# Patient Record
Sex: FEMALE | Race: BLACK OR AFRICAN AMERICAN | Employment: UNEMPLOYED
[De-identification: names, ages, dates, MRNs, and addresses within clinical notes are randomized per-mention and may not be internally consistent; named-entity substitution may affect disease eponyms.]

---

## 2017-01-06 ENCOUNTER — OFFICE VISIT (OUTPATIENT)
Dept: BARIATRICS/WEIGHT MGMT | Facility: CLINIC | Age: 37
End: 2017-01-06

## 2017-01-06 ENCOUNTER — TELEPHONE (OUTPATIENT)
Dept: FAMILY MEDICINE CLINIC | Facility: CLINIC | Age: 37
End: 2017-01-06

## 2017-01-06 VITALS
HEART RATE: 74 BPM | DIASTOLIC BLOOD PRESSURE: 68 MMHG | RESPIRATION RATE: 16 BRPM | BODY MASS INDEX: 50.02 KG/M2 | SYSTOLIC BLOOD PRESSURE: 128 MMHG | WEIGHT: 293 LBS | HEIGHT: 64 IN

## 2017-01-06 DIAGNOSIS — F32.A ANXIETY AND DEPRESSION: ICD-10-CM

## 2017-01-06 DIAGNOSIS — R73.03 PREDIABETES: Primary | ICD-10-CM

## 2017-01-06 DIAGNOSIS — M54.10 RADICULAR LOW BACK PAIN: ICD-10-CM

## 2017-01-06 DIAGNOSIS — E66.01 MORBID OBESITY WITH BMI OF 50.0-59.9, ADULT (HCC): ICD-10-CM

## 2017-01-06 DIAGNOSIS — F81.9 LEARNING DISABILITY: ICD-10-CM

## 2017-01-06 DIAGNOSIS — D68.9 BLOOD CLOTTING DISORDER (HCC): ICD-10-CM

## 2017-01-06 DIAGNOSIS — I83.93 VARICOSE VEINS OF LEGS: ICD-10-CM

## 2017-01-06 DIAGNOSIS — F41.9 ANXIETY AND DEPRESSION: ICD-10-CM

## 2017-01-06 DIAGNOSIS — Z86.69 HX OF MIGRAINES: ICD-10-CM

## 2017-01-06 PROCEDURE — 99204 OFFICE O/P NEW MOD 45 MIN: CPT | Performed by: NURSE PRACTITIONER

## 2017-01-06 RX ORDER — ASPIRIN 81 MG/1
TABLET ORAL
COMMUNITY
Start: 2016-11-30 | End: 2017-01-17

## 2017-01-06 RX ORDER — PHENTERMINE HYDROCHLORIDE 37.5 MG/1
37.5 CAPSULE ORAL EVERY MORNING
COMMUNITY
End: 2017-01-12

## 2017-01-06 RX ORDER — VENLAFAXINE 50 MG/1
50 TABLET ORAL DAILY
COMMUNITY
End: 2017-05-19 | Stop reason: SDUPTHER

## 2017-01-06 RX ORDER — OMEGA-3S/DHA/EPA/FISH OIL/D3 300MG-1000
CAPSULE ORAL
COMMUNITY
Start: 2016-11-30

## 2017-01-12 ENCOUNTER — OFFICE VISIT (OUTPATIENT)
Dept: FAMILY MEDICINE CLINIC | Facility: CLINIC | Age: 37
End: 2017-01-12

## 2017-01-12 VITALS
SYSTOLIC BLOOD PRESSURE: 120 MMHG | HEART RATE: 85 BPM | OXYGEN SATURATION: 98 % | WEIGHT: 293 LBS | HEIGHT: 63 IN | BODY MASS INDEX: 51.91 KG/M2 | DIASTOLIC BLOOD PRESSURE: 74 MMHG | RESPIRATION RATE: 16 BRPM

## 2017-01-12 DIAGNOSIS — E66.01 MORBID OBESITY WITH BMI OF 50.0-59.9, ADULT (HCC): ICD-10-CM

## 2017-01-12 PROCEDURE — 99213 OFFICE O/P EST LOW 20 MIN: CPT | Performed by: FAMILY MEDICINE

## 2017-01-12 RX ORDER — PHENTERMINE HYDROCHLORIDE 37.5 MG/1
37.5 CAPSULE ORAL EVERY MORNING
Qty: 30 CAPSULE | Refills: 0 | Status: CANCELLED | OUTPATIENT
Start: 2017-01-12

## 2017-01-12 RX ORDER — PHENTERMINE HYDROCHLORIDE 37.5 MG/1
37.5 TABLET ORAL
Qty: 30 TABLET | Refills: 0 | Status: CANCELLED | OUTPATIENT
Start: 2017-01-12 | End: 2017-02-11

## 2017-01-12 RX ORDER — PHENTERMINE HYDROCHLORIDE 37.5 MG/1
37.5 TABLET ORAL
Qty: 30 TABLET | Refills: 0 | Status: SHIPPED | OUTPATIENT
Start: 2017-01-12 | End: 2017-02-06 | Stop reason: SDUPTHER

## 2017-01-12 ASSESSMENT — PATIENT HEALTH QUESTIONNAIRE - PHQ9
2. FEELING DOWN, DEPRESSED OR HOPELESS: 0
SUM OF ALL RESPONSES TO PHQ QUESTIONS 1-9: 0
SUM OF ALL RESPONSES TO PHQ9 QUESTIONS 1 & 2: 0
1. LITTLE INTEREST OR PLEASURE IN DOING THINGS: 0

## 2017-01-16 ENCOUNTER — TELEPHONE (OUTPATIENT)
Dept: BARIATRICS/WEIGHT MGMT | Facility: CLINIC | Age: 37
End: 2017-01-16

## 2017-01-17 ENCOUNTER — OFFICE VISIT (OUTPATIENT)
Dept: BARIATRICS/WEIGHT MGMT | Facility: CLINIC | Age: 37
End: 2017-01-17

## 2017-01-17 VITALS
DIASTOLIC BLOOD PRESSURE: 70 MMHG | HEIGHT: 63 IN | HEART RATE: 68 BPM | BODY MASS INDEX: 51.91 KG/M2 | WEIGHT: 293 LBS | SYSTOLIC BLOOD PRESSURE: 132 MMHG

## 2017-01-17 DIAGNOSIS — F32.A ANXIETY AND DEPRESSION: ICD-10-CM

## 2017-01-17 DIAGNOSIS — F41.9 ANXIETY AND DEPRESSION: ICD-10-CM

## 2017-01-17 DIAGNOSIS — E66.01 MORBID OBESITY WITH BMI OF 50.0-59.9, ADULT (HCC): ICD-10-CM

## 2017-01-17 DIAGNOSIS — M54.10 RADICULAR LOW BACK PAIN: ICD-10-CM

## 2017-01-17 DIAGNOSIS — R73.03 PREDIABETES: Primary | ICD-10-CM

## 2017-01-17 PROCEDURE — 99213 OFFICE O/P EST LOW 20 MIN: CPT | Performed by: NURSE PRACTITIONER

## 2017-01-17 RX ORDER — ASPIRIN 81 MG/1
81 TABLET, CHEWABLE ORAL DAILY
Qty: 30 TABLET | Refills: 3 | Status: SHIPPED | OUTPATIENT
Start: 2017-01-17

## 2017-01-24 ENCOUNTER — OFFICE VISIT (OUTPATIENT)
Dept: BARIATRICS/WEIGHT MGMT | Facility: CLINIC | Age: 37
End: 2017-01-24

## 2017-01-24 VITALS
HEIGHT: 63 IN | DIASTOLIC BLOOD PRESSURE: 70 MMHG | WEIGHT: 293 LBS | SYSTOLIC BLOOD PRESSURE: 132 MMHG | BODY MASS INDEX: 51.91 KG/M2 | HEART RATE: 80 BPM

## 2017-01-24 DIAGNOSIS — E66.01 MORBID OBESITY WITH BMI OF 50.0-59.9, ADULT (HCC): ICD-10-CM

## 2017-01-24 DIAGNOSIS — F41.9 ANXIETY AND DEPRESSION: ICD-10-CM

## 2017-01-24 DIAGNOSIS — M54.10 RADICULAR LOW BACK PAIN: ICD-10-CM

## 2017-01-24 DIAGNOSIS — F32.A ANXIETY AND DEPRESSION: ICD-10-CM

## 2017-01-24 DIAGNOSIS — I83.93 VARICOSE VEINS OF LEGS: ICD-10-CM

## 2017-01-24 DIAGNOSIS — R73.03 PREDIABETES: Primary | ICD-10-CM

## 2017-01-24 PROCEDURE — 99213 OFFICE O/P EST LOW 20 MIN: CPT | Performed by: NURSE PRACTITIONER

## 2017-01-30 ENCOUNTER — TELEPHONE (OUTPATIENT)
Dept: FAMILY MEDICINE CLINIC | Facility: CLINIC | Age: 37
End: 2017-01-30

## 2017-02-06 ENCOUNTER — OFFICE VISIT (OUTPATIENT)
Dept: FAMILY MEDICINE CLINIC | Facility: CLINIC | Age: 37
End: 2017-02-06

## 2017-02-06 VITALS
DIASTOLIC BLOOD PRESSURE: 91 MMHG | HEART RATE: 86 BPM | HEIGHT: 63 IN | SYSTOLIC BLOOD PRESSURE: 133 MMHG | WEIGHT: 293 LBS | BODY MASS INDEX: 51.91 KG/M2

## 2017-02-06 DIAGNOSIS — E66.01 MORBID OBESITY WITH BMI OF 50.0-59.9, ADULT (HCC): ICD-10-CM

## 2017-02-06 DIAGNOSIS — R20.0 NUMBNESS AND TINGLING OF RIGHT ARM: Primary | ICD-10-CM

## 2017-02-06 DIAGNOSIS — R20.2 NUMBNESS AND TINGLING OF RIGHT ARM: Primary | ICD-10-CM

## 2017-02-06 PROCEDURE — 99214 OFFICE O/P EST MOD 30 MIN: CPT | Performed by: FAMILY MEDICINE

## 2017-02-06 RX ORDER — PHENTERMINE HYDROCHLORIDE 37.5 MG/1
37.5 TABLET ORAL
Qty: 30 TABLET | Refills: 0 | Status: SHIPPED | OUTPATIENT
Start: 2017-02-06 | End: 2017-03-07 | Stop reason: ALTCHOICE

## 2017-02-06 RX ORDER — PHENTERMINE HYDROCHLORIDE 37.5 MG/1
37.5 TABLET ORAL
Qty: 30 TABLET | Refills: 0 | Status: SHIPPED | OUTPATIENT
Start: 2017-02-06 | End: 2017-02-06 | Stop reason: SDUPTHER

## 2017-02-07 RX ORDER — METOPROLOL SUCCINATE 50 MG/1
TABLET, EXTENDED RELEASE ORAL
Qty: 30 TABLET | Refills: 3 | Status: SHIPPED | OUTPATIENT
Start: 2017-02-07 | End: 2017-07-05 | Stop reason: SDUPTHER

## 2017-02-10 ENCOUNTER — OFFICE VISIT (OUTPATIENT)
Dept: BARIATRICS/WEIGHT MGMT | Facility: CLINIC | Age: 37
End: 2017-02-10

## 2017-02-10 VITALS
HEIGHT: 63 IN | WEIGHT: 293 LBS | SYSTOLIC BLOOD PRESSURE: 138 MMHG | BODY MASS INDEX: 51.91 KG/M2 | DIASTOLIC BLOOD PRESSURE: 84 MMHG | HEART RATE: 78 BPM

## 2017-02-10 DIAGNOSIS — D68.9 BLOOD CLOTTING DISORDER (HCC): ICD-10-CM

## 2017-02-10 DIAGNOSIS — F41.9 ANXIETY AND DEPRESSION: ICD-10-CM

## 2017-02-10 DIAGNOSIS — F81.9 LEARNING DISABILITY: ICD-10-CM

## 2017-02-10 DIAGNOSIS — F32.A ANXIETY AND DEPRESSION: ICD-10-CM

## 2017-02-10 DIAGNOSIS — I83.93 VARICOSE VEINS OF LEGS: ICD-10-CM

## 2017-02-10 DIAGNOSIS — E66.01 MORBID OBESITY WITH BMI OF 50.0-59.9, ADULT (HCC): ICD-10-CM

## 2017-02-10 DIAGNOSIS — R73.03 PREDIABETES: Primary | ICD-10-CM

## 2017-02-10 DIAGNOSIS — M54.10 RADICULAR LOW BACK PAIN: ICD-10-CM

## 2017-02-10 PROCEDURE — 99213 OFFICE O/P EST LOW 20 MIN: CPT | Performed by: NURSE PRACTITIONER

## 2017-02-15 ENCOUNTER — NURSE ONLY (OUTPATIENT)
Dept: FAMILY MEDICINE CLINIC | Facility: CLINIC | Age: 37
End: 2017-02-15

## 2017-02-15 DIAGNOSIS — H93.93 EAR PROBLEM, BILATERAL: Primary | ICD-10-CM

## 2017-02-24 ENCOUNTER — OFFICE VISIT (OUTPATIENT)
Dept: BARIATRICS/WEIGHT MGMT | Facility: CLINIC | Age: 37
End: 2017-02-24

## 2017-02-24 ENCOUNTER — TELEPHONE (OUTPATIENT)
Dept: FAMILY MEDICINE CLINIC | Facility: CLINIC | Age: 37
End: 2017-02-24

## 2017-02-24 VITALS
WEIGHT: 293 LBS | DIASTOLIC BLOOD PRESSURE: 68 MMHG | BODY MASS INDEX: 51.91 KG/M2 | HEART RATE: 74 BPM | SYSTOLIC BLOOD PRESSURE: 128 MMHG | HEIGHT: 63 IN

## 2017-02-24 DIAGNOSIS — F41.9 ANXIETY AND DEPRESSION: ICD-10-CM

## 2017-02-24 DIAGNOSIS — E66.01 MORBID OBESITY WITH BMI OF 50.0-59.9, ADULT (HCC): ICD-10-CM

## 2017-02-24 DIAGNOSIS — M54.10 RADICULAR LOW BACK PAIN: ICD-10-CM

## 2017-02-24 DIAGNOSIS — F32.A ANXIETY AND DEPRESSION: ICD-10-CM

## 2017-02-24 DIAGNOSIS — F81.9 LEARNING DISABILITY: ICD-10-CM

## 2017-02-24 DIAGNOSIS — R73.03 PREDIABETES: Primary | ICD-10-CM

## 2017-02-24 DIAGNOSIS — I83.93 VARICOSE VEINS OF LEGS: ICD-10-CM

## 2017-02-24 PROCEDURE — 99213 OFFICE O/P EST LOW 20 MIN: CPT | Performed by: NURSE PRACTITIONER

## 2017-03-03 ENCOUNTER — OFFICE VISIT (OUTPATIENT)
Dept: BARIATRICS/WEIGHT MGMT | Facility: CLINIC | Age: 37
End: 2017-03-03

## 2017-03-03 VITALS
BODY MASS INDEX: 51.91 KG/M2 | DIASTOLIC BLOOD PRESSURE: 82 MMHG | SYSTOLIC BLOOD PRESSURE: 126 MMHG | WEIGHT: 293 LBS | HEIGHT: 63 IN | HEART RATE: 68 BPM

## 2017-03-03 DIAGNOSIS — M54.10 RADICULAR LOW BACK PAIN: Primary | ICD-10-CM

## 2017-03-03 DIAGNOSIS — F32.A ANXIETY AND DEPRESSION: ICD-10-CM

## 2017-03-03 DIAGNOSIS — E66.01 MORBID OBESITY WITH BMI OF 50.0-59.9, ADULT (HCC): ICD-10-CM

## 2017-03-03 DIAGNOSIS — F41.9 ANXIETY AND DEPRESSION: ICD-10-CM

## 2017-03-03 DIAGNOSIS — R73.03 PREDIABETES: ICD-10-CM

## 2017-03-03 PROCEDURE — 99213 OFFICE O/P EST LOW 20 MIN: CPT | Performed by: NURSE PRACTITIONER

## 2017-03-07 ENCOUNTER — OFFICE VISIT (OUTPATIENT)
Dept: FAMILY MEDICINE CLINIC | Facility: CLINIC | Age: 37
End: 2017-03-07

## 2017-03-07 VITALS
HEIGHT: 63 IN | WEIGHT: 293 LBS | DIASTOLIC BLOOD PRESSURE: 74 MMHG | OXYGEN SATURATION: 98 % | BODY MASS INDEX: 51.91 KG/M2 | HEART RATE: 76 BPM | RESPIRATION RATE: 22 BRPM | SYSTOLIC BLOOD PRESSURE: 138 MMHG

## 2017-03-07 DIAGNOSIS — E66.01 MORBID OBESITY WITH BMI OF 50.0-59.9, ADULT (HCC): Primary | ICD-10-CM

## 2017-03-07 PROCEDURE — 99213 OFFICE O/P EST LOW 20 MIN: CPT | Performed by: FAMILY MEDICINE

## 2017-03-07 RX ORDER — LANCING DEVICE
EACH MISCELLANEOUS
COMMUNITY
Start: 2017-02-10

## 2017-03-07 RX ORDER — METOPROLOL TARTRATE 75 MG/1
75 TABLET, FILM COATED ORAL
COMMUNITY
End: 2017-03-07 | Stop reason: DRUGHIGH

## 2017-03-07 RX ORDER — CALCIUM CITRATE/VITAMIN D3 200MG-6.25
TABLET ORAL
COMMUNITY
Start: 2017-02-10

## 2017-03-09 ENCOUNTER — OFFICE VISIT (OUTPATIENT)
Dept: OBGYN | Facility: CLINIC | Age: 37
End: 2017-03-09

## 2017-03-09 ENCOUNTER — HOSPITAL ENCOUNTER (OUTPATIENT)
Age: 37
Setting detail: SPECIMEN
Discharge: HOME | End: 2017-03-09
Payer: MEDICARE

## 2017-03-09 VITALS
BODY MASS INDEX: 51.91 KG/M2 | HEIGHT: 63 IN | SYSTOLIC BLOOD PRESSURE: 130 MMHG | WEIGHT: 293 LBS | DIASTOLIC BLOOD PRESSURE: 88 MMHG

## 2017-03-09 DIAGNOSIS — Z01.419 ENCOUNTER FOR GYNECOLOGICAL EXAMINATION WITHOUT ABNORMAL FINDING: Primary | ICD-10-CM

## 2017-03-09 PROCEDURE — 99385 PREV VISIT NEW AGE 18-39: CPT | Performed by: OBSTETRICS & GYNECOLOGY

## 2017-03-09 ASSESSMENT — ENCOUNTER SYMPTOMS
SHORTNESS OF BREATH: 0
BLURRED VISION: 0
BACK PAIN: 0
COUGH: 0
ABDOMINAL PAIN: 0
CONSTIPATION: 0
PHOTOPHOBIA: 0

## 2017-03-10 ENCOUNTER — OFFICE VISIT (OUTPATIENT)
Dept: BARIATRICS/WEIGHT MGMT | Facility: CLINIC | Age: 37
End: 2017-03-10

## 2017-03-10 VITALS
SYSTOLIC BLOOD PRESSURE: 126 MMHG | BODY MASS INDEX: 51.91 KG/M2 | DIASTOLIC BLOOD PRESSURE: 80 MMHG | HEIGHT: 63 IN | HEART RATE: 74 BPM | WEIGHT: 293 LBS

## 2017-03-10 DIAGNOSIS — E66.01 MORBID OBESITY WITH BMI OF 50.0-59.9, ADULT (HCC): ICD-10-CM

## 2017-03-10 DIAGNOSIS — F41.9 ANXIETY AND DEPRESSION: ICD-10-CM

## 2017-03-10 DIAGNOSIS — M54.10 RADICULAR LOW BACK PAIN: Primary | ICD-10-CM

## 2017-03-10 DIAGNOSIS — F32.A ANXIETY AND DEPRESSION: ICD-10-CM

## 2017-03-10 DIAGNOSIS — R73.03 PREDIABETES: ICD-10-CM

## 2017-03-10 PROCEDURE — 99213 OFFICE O/P EST LOW 20 MIN: CPT | Performed by: NURSE PRACTITIONER

## 2017-03-14 LAB — CYTOLOGY REPORT: NORMAL

## 2017-03-24 ENCOUNTER — OFFICE VISIT (OUTPATIENT)
Dept: BARIATRICS/WEIGHT MGMT | Age: 37
End: 2017-03-24
Payer: MEDICARE

## 2017-03-24 VITALS
WEIGHT: 293 LBS | BODY MASS INDEX: 51.91 KG/M2 | HEART RATE: 68 BPM | HEIGHT: 63 IN | DIASTOLIC BLOOD PRESSURE: 78 MMHG | SYSTOLIC BLOOD PRESSURE: 128 MMHG

## 2017-03-24 DIAGNOSIS — R73.03 PREDIABETES: ICD-10-CM

## 2017-03-24 DIAGNOSIS — F41.9 ANXIETY AND DEPRESSION: ICD-10-CM

## 2017-03-24 DIAGNOSIS — F32.A ANXIETY AND DEPRESSION: ICD-10-CM

## 2017-03-24 DIAGNOSIS — E66.01 MORBID OBESITY WITH BMI OF 50.0-59.9, ADULT (HCC): ICD-10-CM

## 2017-03-24 DIAGNOSIS — M54.10 RADICULAR LOW BACK PAIN: Primary | ICD-10-CM

## 2017-03-24 PROCEDURE — 99213 OFFICE O/P EST LOW 20 MIN: CPT | Performed by: NURSE PRACTITIONER

## 2017-04-07 ENCOUNTER — OFFICE VISIT (OUTPATIENT)
Dept: BARIATRICS/WEIGHT MGMT | Age: 37
End: 2017-04-07
Payer: MEDICARE

## 2017-04-07 VITALS
SYSTOLIC BLOOD PRESSURE: 124 MMHG | HEIGHT: 63 IN | WEIGHT: 293 LBS | DIASTOLIC BLOOD PRESSURE: 74 MMHG | BODY MASS INDEX: 51.91 KG/M2 | HEART RATE: 68 BPM

## 2017-04-07 DIAGNOSIS — I83.93 VARICOSE VEINS OF LEGS: ICD-10-CM

## 2017-04-07 DIAGNOSIS — R73.03 PREDIABETES: Primary | ICD-10-CM

## 2017-04-07 DIAGNOSIS — M54.10 RADICULAR LOW BACK PAIN: ICD-10-CM

## 2017-04-07 DIAGNOSIS — F32.A ANXIETY AND DEPRESSION: ICD-10-CM

## 2017-04-07 DIAGNOSIS — E66.01 MORBID OBESITY WITH BMI OF 50.0-59.9, ADULT (HCC): ICD-10-CM

## 2017-04-07 DIAGNOSIS — F41.9 ANXIETY AND DEPRESSION: ICD-10-CM

## 2017-04-07 PROCEDURE — 99213 OFFICE O/P EST LOW 20 MIN: CPT | Performed by: NURSE PRACTITIONER

## 2017-05-09 ENCOUNTER — OFFICE VISIT (OUTPATIENT)
Dept: BARIATRICS/WEIGHT MGMT | Age: 37
End: 2017-05-09
Payer: MEDICARE

## 2017-05-09 VITALS
HEIGHT: 63 IN | HEART RATE: 74 BPM | BODY MASS INDEX: 51.91 KG/M2 | WEIGHT: 293 LBS | SYSTOLIC BLOOD PRESSURE: 128 MMHG | DIASTOLIC BLOOD PRESSURE: 78 MMHG

## 2017-05-09 DIAGNOSIS — I83.93 VARICOSE VEINS OF LEGS: ICD-10-CM

## 2017-05-09 DIAGNOSIS — M54.10 RADICULAR LOW BACK PAIN: ICD-10-CM

## 2017-05-09 DIAGNOSIS — R73.03 PREDIABETES: Primary | ICD-10-CM

## 2017-05-09 DIAGNOSIS — F32.A ANXIETY AND DEPRESSION: ICD-10-CM

## 2017-05-09 DIAGNOSIS — E66.01 MORBID OBESITY WITH BMI OF 50.0-59.9, ADULT (HCC): ICD-10-CM

## 2017-05-09 DIAGNOSIS — F41.9 ANXIETY AND DEPRESSION: ICD-10-CM

## 2017-05-09 PROCEDURE — 99213 OFFICE O/P EST LOW 20 MIN: CPT | Performed by: NURSE PRACTITIONER

## 2017-05-19 ENCOUNTER — OFFICE VISIT (OUTPATIENT)
Dept: FAMILY MEDICINE CLINIC | Age: 37
End: 2017-05-19
Payer: MEDICARE

## 2017-05-19 VITALS
WEIGHT: 293 LBS | BODY MASS INDEX: 51.91 KG/M2 | HEART RATE: 78 BPM | OXYGEN SATURATION: 99 % | DIASTOLIC BLOOD PRESSURE: 77 MMHG | HEIGHT: 63 IN | SYSTOLIC BLOOD PRESSURE: 128 MMHG

## 2017-05-19 DIAGNOSIS — M54.42 LUMBAGO WITH SCIATICA, LEFT SIDE: Primary | ICD-10-CM

## 2017-05-19 DIAGNOSIS — R73.03 PRE-DIABETES: ICD-10-CM

## 2017-05-19 PROCEDURE — 99214 OFFICE O/P EST MOD 30 MIN: CPT | Performed by: FAMILY MEDICINE

## 2017-05-19 RX ORDER — VENLAFAXINE HYDROCHLORIDE 75 MG/1
1 CAPSULE, EXTENDED RELEASE ORAL DAILY
COMMUNITY
Start: 2017-04-17 | End: 2017-11-08

## 2017-05-19 RX ORDER — NAPROXEN 500 MG/1
500 TABLET ORAL 2 TIMES DAILY WITH MEALS
Qty: 60 TABLET | Refills: 3 | Status: SHIPPED | OUTPATIENT
Start: 2017-05-19 | End: 2017-05-20 | Stop reason: SDUPTHER

## 2017-05-20 DIAGNOSIS — M54.42 LUMBAGO WITH SCIATICA, LEFT SIDE: ICD-10-CM

## 2017-05-20 RX ORDER — NAPROXEN 500 MG/1
500 TABLET ORAL 2 TIMES DAILY WITH MEALS
Qty: 60 TABLET | Refills: 3 | Status: SHIPPED | OUTPATIENT
Start: 2017-05-20

## 2017-06-01 ENCOUNTER — HOSPITAL ENCOUNTER (OUTPATIENT)
Dept: PHYSICAL THERAPY | Facility: CLINIC | Age: 37
Setting detail: THERAPIES SERIES
Discharge: HOME | End: 2017-06-01
Payer: MEDICARE

## 2017-06-01 PROCEDURE — 97162 PT EVAL MOD COMPLEX 30 MIN: CPT

## 2017-06-01 PROCEDURE — 97110 THERAPEUTIC EXERCISES: CPT

## 2017-06-05 ENCOUNTER — HOSPITAL ENCOUNTER (OUTPATIENT)
Dept: PHYSICAL THERAPY | Facility: CLINIC | Age: 37
Setting detail: THERAPIES SERIES
Discharge: HOME | End: 2017-06-05
Payer: MEDICARE

## 2017-06-05 PROCEDURE — 97110 THERAPEUTIC EXERCISES: CPT

## 2017-06-07 ENCOUNTER — HOSPITAL ENCOUNTER (OUTPATIENT)
Dept: PHYSICAL THERAPY | Facility: CLINIC | Age: 37
Setting detail: THERAPIES SERIES
Discharge: HOME | End: 2017-06-07
Payer: MEDICARE

## 2017-06-12 ENCOUNTER — HOSPITAL ENCOUNTER (OUTPATIENT)
Dept: PHYSICAL THERAPY | Facility: CLINIC | Age: 37
Setting detail: THERAPIES SERIES
Discharge: HOME | End: 2017-06-12
Payer: MEDICARE

## 2017-06-13 ENCOUNTER — OFFICE VISIT (OUTPATIENT)
Dept: BARIATRICS/WEIGHT MGMT | Age: 37
End: 2017-06-13
Payer: MEDICARE

## 2017-06-13 VITALS
HEIGHT: 63 IN | SYSTOLIC BLOOD PRESSURE: 126 MMHG | WEIGHT: 293 LBS | BODY MASS INDEX: 51.91 KG/M2 | HEART RATE: 72 BPM | DIASTOLIC BLOOD PRESSURE: 70 MMHG

## 2017-06-13 DIAGNOSIS — M54.10 RADICULAR LOW BACK PAIN: ICD-10-CM

## 2017-06-13 DIAGNOSIS — R73.03 PREDIABETES: Primary | ICD-10-CM

## 2017-06-13 DIAGNOSIS — E66.01 MORBID OBESITY WITH BMI OF 50.0-59.9, ADULT (HCC): ICD-10-CM

## 2017-06-13 DIAGNOSIS — F32.A ANXIETY AND DEPRESSION: ICD-10-CM

## 2017-06-13 DIAGNOSIS — F41.9 ANXIETY AND DEPRESSION: ICD-10-CM

## 2017-06-13 DIAGNOSIS — I83.93 VARICOSE VEINS OF LEGS: ICD-10-CM

## 2017-06-13 PROCEDURE — 99213 OFFICE O/P EST LOW 20 MIN: CPT | Performed by: NURSE PRACTITIONER

## 2017-06-15 ENCOUNTER — HOSPITAL ENCOUNTER (OUTPATIENT)
Dept: PHYSICAL THERAPY | Facility: CLINIC | Age: 37
Setting detail: THERAPIES SERIES
Discharge: HOME | End: 2017-06-15
Payer: MEDICARE

## 2017-06-15 PROCEDURE — 97110 THERAPEUTIC EXERCISES: CPT

## 2017-06-15 PROCEDURE — 97530 THERAPEUTIC ACTIVITIES: CPT

## 2017-06-19 ENCOUNTER — HOSPITAL ENCOUNTER (OUTPATIENT)
Dept: PHYSICAL THERAPY | Facility: CLINIC | Age: 37
Setting detail: THERAPIES SERIES
Discharge: HOME | End: 2017-06-19
Payer: MEDICARE

## 2017-06-21 ENCOUNTER — HOSPITAL ENCOUNTER (OUTPATIENT)
Dept: PHYSICAL THERAPY | Facility: CLINIC | Age: 37
Setting detail: THERAPIES SERIES
Discharge: HOME | End: 2017-06-21
Payer: MEDICARE

## 2017-06-21 PROCEDURE — 97110 THERAPEUTIC EXERCISES: CPT

## 2017-06-21 PROCEDURE — 97140 MANUAL THERAPY 1/> REGIONS: CPT

## 2017-08-08 ENCOUNTER — OFFICE VISIT (OUTPATIENT)
Dept: BARIATRICS/WEIGHT MGMT | Age: 37
End: 2017-08-08
Payer: MEDICARE

## 2017-08-08 VITALS
SYSTOLIC BLOOD PRESSURE: 124 MMHG | DIASTOLIC BLOOD PRESSURE: 68 MMHG | BODY MASS INDEX: 51.91 KG/M2 | HEIGHT: 63 IN | WEIGHT: 293 LBS | HEART RATE: 74 BPM

## 2017-08-08 DIAGNOSIS — M54.10 RADICULAR LOW BACK PAIN: ICD-10-CM

## 2017-08-08 DIAGNOSIS — I83.93 VARICOSE VEINS OF LEGS: ICD-10-CM

## 2017-08-08 DIAGNOSIS — R73.03 PREDIABETES: Primary | ICD-10-CM

## 2017-08-08 DIAGNOSIS — E66.01 MORBID OBESITY WITH BMI OF 50.0-59.9, ADULT (HCC): ICD-10-CM

## 2017-08-08 DIAGNOSIS — F32.A ANXIETY AND DEPRESSION: ICD-10-CM

## 2017-08-08 DIAGNOSIS — F81.9 LEARNING DISABILITY: ICD-10-CM

## 2017-08-08 DIAGNOSIS — F41.9 ANXIETY AND DEPRESSION: ICD-10-CM

## 2017-08-08 PROCEDURE — 99213 OFFICE O/P EST LOW 20 MIN: CPT | Performed by: NURSE PRACTITIONER

## 2017-09-12 ENCOUNTER — OFFICE VISIT (OUTPATIENT)
Dept: BARIATRICS/WEIGHT MGMT | Age: 37
End: 2017-09-12
Payer: MEDICARE

## 2017-09-12 VITALS
WEIGHT: 293 LBS | HEART RATE: 68 BPM | HEIGHT: 63 IN | DIASTOLIC BLOOD PRESSURE: 74 MMHG | BODY MASS INDEX: 51.91 KG/M2 | SYSTOLIC BLOOD PRESSURE: 120 MMHG

## 2017-09-12 DIAGNOSIS — R73.03 PREDIABETES: Primary | ICD-10-CM

## 2017-09-12 DIAGNOSIS — E66.01 MORBID OBESITY WITH BMI OF 50.0-59.9, ADULT (HCC): ICD-10-CM

## 2017-09-12 DIAGNOSIS — F32.A ANXIETY AND DEPRESSION: ICD-10-CM

## 2017-09-12 DIAGNOSIS — M54.10 RADICULAR LOW BACK PAIN: ICD-10-CM

## 2017-09-12 DIAGNOSIS — I83.93 VARICOSE VEINS OF LEGS: ICD-10-CM

## 2017-09-12 DIAGNOSIS — F41.9 ANXIETY AND DEPRESSION: ICD-10-CM

## 2017-09-12 PROCEDURE — 99213 OFFICE O/P EST LOW 20 MIN: CPT | Performed by: NURSE PRACTITIONER

## 2017-11-07 ENCOUNTER — OFFICE VISIT (OUTPATIENT)
Dept: BARIATRICS/WEIGHT MGMT | Age: 37
End: 2017-11-07
Payer: MEDICARE

## 2017-11-07 VITALS
HEIGHT: 63 IN | DIASTOLIC BLOOD PRESSURE: 88 MMHG | BODY MASS INDEX: 51.91 KG/M2 | HEART RATE: 76 BPM | WEIGHT: 293 LBS | SYSTOLIC BLOOD PRESSURE: 130 MMHG

## 2017-11-07 DIAGNOSIS — M54.10 RADICULAR LOW BACK PAIN: ICD-10-CM

## 2017-11-07 DIAGNOSIS — I83.93 VARICOSE VEINS OF LEGS: ICD-10-CM

## 2017-11-07 DIAGNOSIS — F41.9 ANXIETY AND DEPRESSION: ICD-10-CM

## 2017-11-07 DIAGNOSIS — R73.03 PREDIABETES: Primary | ICD-10-CM

## 2017-11-07 DIAGNOSIS — F32.A ANXIETY AND DEPRESSION: ICD-10-CM

## 2017-11-07 PROCEDURE — G8427 DOCREV CUR MEDS BY ELIG CLIN: HCPCS | Performed by: NURSE PRACTITIONER

## 2017-11-07 PROCEDURE — 99213 OFFICE O/P EST LOW 20 MIN: CPT | Performed by: NURSE PRACTITIONER

## 2017-11-07 PROCEDURE — 1036F TOBACCO NON-USER: CPT | Performed by: NURSE PRACTITIONER

## 2017-11-07 PROCEDURE — G8484 FLU IMMUNIZE NO ADMIN: HCPCS | Performed by: NURSE PRACTITIONER

## 2017-11-07 PROCEDURE — G8417 CALC BMI ABV UP PARAM F/U: HCPCS | Performed by: NURSE PRACTITIONER

## 2017-11-07 NOTE — PROGRESS NOTES
 PHARMACIST CHOICE ALCOHOL 70 % PADS       Lancet Devices (SIMPLE DIAGNOSTICS LANCING DEV) MISC       PHARMACIST CHOICE LANCETS MIS       aspirin 81 MG chewable tablet Take 1 tablet by mouth daily 30 tablet 3    Omega-3 Fatty Acids (FISH OIL BURP-LESS) 1200 MG CAPS       hydrOXYzine (VISTARIL) 25 MG capsule Take 25 mg by mouth      DHA-EPA-VITAMIN E PO Take 2 g by mouth       No current facility-administered medications for this visit. Vital Signs:  /88 (Site: Right Arm, Position: Sitting, Cuff Size: Large Adult)   Pulse 76   Ht 5' 2.99\" (1.6 m)   Wt (!) 316 lb (143.3 kg)   BMI 55.99 kg/m²     BMI/Height/Weight:  Body mass index is 55.99 kg/m². Review of Systems  Constitutional: Weight gain      Objective:      Physical Exam   Vital signs reviewed. General Appearance: Well-developed and well-nourished. No acute distress. Skin: Warm, dry. Head: Normocephalic. Pulmonary/Chest: Normal respiratory effort. Musculoskeletal: Movement x4. Neurological:  Alert and oriented. Individual goal for this encounter:  Lose weight and improve health. [x] Vital signs reviewed and discussed with patient.   [] Labs/EKG reviewed and discussed with patient. [] Blood sugar log reviewed and discussed with patient. [] Physical activity discussed. [] Medication changes recommended. [] Smoking cessation discussed. [x] Specific questions/concerns addressed.       Specific group medical question(s) addressed in this encounter:  Discussion about gout    Group discussion topic for this encounter:     [] Welcome Jumpstart   [] Be a Fat & Calorie    [] Healthy Eating   [] Move Those Muscles   [] Tip the Calorie Balance   [] Take charge of What's Around You   [] Problem Solving   [] Four Keys to Healthy Eating Out   [] [place] of Lifestyle Change   [] Jumpstart your Activity Plan   [] Make Social Cues Work for Conor [place]   [] Ways to Stay Motivated   [] Preparing for Long Term Self-Management   [] More Volume, Fewer Calories   [] Balance Your Thoughts   [] Strengthen your Exercise Program   [] Mindful Eating   [] Stress and Time Managment   [x] Standing Up for Your Health   [] Heart Health   [] Stretching:  The Truth About Flexibility   [] Looking Back and Looking Forward    Total time:  90 minutes, greater than 50% of visit spent in group counseling/education. Assessment:      1. Prediabetes     2. Anxiety and depression     3. Varicose veins of legs     4. Radicular low back pain         Plan:      Track food and weight. Return to clinic as per group medical appointment schedule. Follow-up:  Return in about 1 month (around 12/7/2017). Orders:  No orders of the defined types were placed in this encounter. Prescriptions:  No orders of the defined types were placed in this encounter.       Electronically signed by:  Asim Haney CNP

## 2017-11-08 ENCOUNTER — OFFICE VISIT (OUTPATIENT)
Dept: FAMILY MEDICINE CLINIC | Age: 37
End: 2017-11-08
Payer: MEDICARE

## 2017-11-08 VITALS
HEIGHT: 63 IN | WEIGHT: 293 LBS | OXYGEN SATURATION: 98 % | SYSTOLIC BLOOD PRESSURE: 138 MMHG | BODY MASS INDEX: 51.91 KG/M2 | DIASTOLIC BLOOD PRESSURE: 84 MMHG | RESPIRATION RATE: 16 BRPM | HEART RATE: 79 BPM

## 2017-11-08 DIAGNOSIS — M76.892 LEFT KNEE TENDONITIS: ICD-10-CM

## 2017-11-08 DIAGNOSIS — Z23 NEEDS FLU SHOT: ICD-10-CM

## 2017-11-08 DIAGNOSIS — E66.01 MORBID OBESITY WITH BMI OF 50.0-59.9, ADULT (HCC): Primary | ICD-10-CM

## 2017-11-08 PROCEDURE — 90471 IMMUNIZATION ADMIN: CPT | Performed by: FAMILY MEDICINE

## 2017-11-08 PROCEDURE — G8427 DOCREV CUR MEDS BY ELIG CLIN: HCPCS | Performed by: FAMILY MEDICINE

## 2017-11-08 PROCEDURE — G8484 FLU IMMUNIZE NO ADMIN: HCPCS | Performed by: FAMILY MEDICINE

## 2017-11-08 PROCEDURE — 90688 IIV4 VACCINE SPLT 0.5 ML IM: CPT | Performed by: FAMILY MEDICINE

## 2017-11-08 PROCEDURE — 1036F TOBACCO NON-USER: CPT | Performed by: FAMILY MEDICINE

## 2017-11-08 PROCEDURE — 99213 OFFICE O/P EST LOW 20 MIN: CPT | Performed by: FAMILY MEDICINE

## 2017-11-08 PROCEDURE — G8417 CALC BMI ABV UP PARAM F/U: HCPCS | Performed by: FAMILY MEDICINE

## 2017-11-08 RX ORDER — VENLAFAXINE HYDROCHLORIDE 150 MG/1
150 CAPSULE, EXTENDED RELEASE ORAL DAILY
COMMUNITY

## 2017-11-08 RX ORDER — IBUPROFEN 800 MG/1
800 TABLET ORAL
COMMUNITY
Start: 2017-11-04

## 2017-11-08 RX ORDER — VENLAFAXINE 37.5 MG/1
37.5 TABLET ORAL 3 TIMES DAILY
COMMUNITY

## 2017-11-08 RX ORDER — PHENTERMINE HYDROCHLORIDE 37.5 MG/1
37.5 TABLET ORAL
Qty: 30 TABLET | Refills: 0 | Status: SHIPPED | OUTPATIENT
Start: 2017-11-08 | End: 2017-11-09

## 2017-11-08 RX ORDER — NAPROXEN 500 MG/1
500 TABLET ORAL
COMMUNITY
End: 2017-11-08

## 2017-11-08 RX ORDER — CLONAZEPAM 0.5 MG/1
0.5 TABLET ORAL
COMMUNITY

## 2017-11-08 RX ORDER — TIZANIDINE 4 MG/1
4 TABLET ORAL
COMMUNITY
Start: 2017-11-04

## 2017-11-08 NOTE — PROGRESS NOTES
Visit Information    Have you changed or started any medications since your last visit including any over-the-counter medicines, vitamins, or herbal medicines? no   Have you stopped taking any of your medications? Is so, why? -  no  Are you having any side effects from any of your medications? - no    Have you seen any other physician or provider since your last visit?  no   Have you had any other diagnostic tests since your last visit? yes - at ER   Have you been seen in the emergency room and/or had an admission in a hospital since we last saw you?  yes [facility]    Have you had your routine dental cleaning in the past 6 months?  no     Do you have an active MyChart account? If no, what is the barrier?   Yes    Patient Care Team:  Osman Francisco MD as PCP - General (Family Medicine)    Medical History Review  Past Medical, Family, and Social History reviewed and does not contribute to the patient presenting condition    Health Maintenance   Topic Date Due    DTaP/Tdap/Td vaccine (1 - Tdap) 05/26/2000    Flu vaccine (1) 09/01/2017    HIV screen  01/01/2018 (Originally 5/26/1996)    Cervical cancer screen  03/09/2020

## 2017-11-08 NOTE — PROGRESS NOTES
General FM note    Kyler Hagen is a 40 y.o. female who presents today for follow up on her  medical conditions as noted below. Kyler Hagen is c/o of   Chief Complaint   Patient presents with    Follow-Up from [facility]  for left leg pain       Patient Active Problem List:     Morbid obesity with BMI of 50.0-59.9, adult ([facility]     Prediabetes     Anxiety and depression     Varicose veins of legs     Blood clotting disorder (HCC)     Radicular low back pain     Learning disability     Hx of migraines     Past Medical History:   Diagnosis Date    Anxiety     Blood clotting disorder ([facility]     Depression     Diabetes ([facility]     Varicose vein of leg       Past Surgical History:   Procedure Laterality Date     SECTION      COLONOSCOPY  2016    TONSILLECTOMY       Family History   Problem Relation Age of Onset    Adopted: Yes     Current Outpatient Prescriptions   Medication Sig Dispense Refill    clonazePAM (KLONOPIN) 0.5 MG tablet Take 0.5 mg by mouth .  tiZANidine (ZANAFLEX) 4 MG tablet Take 4 mg by mouth      venlafaxine (EFFEXOR XR) 150 MG extended release capsule Take 150 mg by mouth daily      venlafaxine (EFFEXOR) 37.5 MG tablet Take 37.5 mg by mouth 3 times daily      phentermine (ADIPEX-P) 37.5 MG tablet Take 1 tablet by mouth every morning (before breakfast) .  30 tablet 0    metoprolol succinate (TOPROL XL) 50 MG extended release tablet TAKE 1 TABLET BY MOUTH EVERY NIGHT AT BEDTIME 30 tablet 3    naproxen (NAPROSYN) 500 MG tablet Take 1 tablet by mouth 2 times daily (with meals) 60 tablet 3    Cholecalciferol (VITAMIN D3) 5000 UNITS TABS Take 1 tablet by mouth daily      TRUE METRIX BLOOD GLUCOSE TEST strip       PHARMACIST CHOICE ALCOHOL 70 % PADS       Lancet Devices (SIMPLE DIAGNOSTICS LANCING DEV) MISC       PHARMACIST CHOICE LANCETS [facility]       aspirin 81 MG chewable tablet Take 1 tablet by mouth daily 30 tablet 3    Omega-3 Fatty Acids (FISH OIL BURP-LESS) 1200 MG CAPS       hydrOXYzine (VISTARIL) 25 MG capsule Take 25 mg by mouth      ibuprofen (ADVIL;MOTRIN) 800 MG tablet Take 800 mg by mouth       No current facility-administered medications for this visit. ALLERGIES:    Allergies   Allergen Reactions    Dairycare [Lactase-Lactobacillus] Diarrhea    Amoxicillin Rash    Penicillins Rash       Social History   Substance Use Topics    Smoking status: Never Smoker    Smokeless tobacco: Never Used    Alcohol use No      Body mass index is 55.64 kg/m². /84   Pulse 79   Resp 16   Ht 5' 2.99\" (1.6 m)   Wt (!) 314 lb (142.4 kg)   LMP 10/30/2017   SpO2 98%   BMI 55.64 kg/m²     Subjective:      HPI    55-year-old morbid morbid obese female coming in after she was seen in the ER because left-sided leg pain. Patient tells me that she gave out candy on Halloween. She was standing a while when this pain at her left leg started and this pain started at her left knee right now as a bandlike pain above her knee. Has been using exercises which makes the pain better. Patient has 2 kids 5 and 61 so. Patient has been seeing weight loss management. She states that she does not want to have weight loss surgery. She is afraid. Patient wants to start Adipex again. She states that she eats very small portions that she is on Slim fast.  She also has a weight scale at her house for her meat portions. Review of Systems   Constitutional: Negative for fever and unexpected weight change. Respiratory: Negative for cough and shortness of breath. Cardiovascular: Negative for chest pain and leg swelling. Gastrointestinal: Negative for diarrhea, constipation and blood in stool. Skin: Negative for color change and rash. Objective:   Physical Exam  Constitutional: VS (see above). General appearance: normal development, habitus and attention, no deformities. Eyes: normal conjunctiva and lids. CAV: RRR, no RMG.  No edema lower extremities. Pulmo: CTA bilateral, no CWR. Skin: no rashes, lesions or ulcers. Musculoskeletal: normal gait. Nails: no clubbing or cyanosis. Psychiatric: alert and oriented to place, time and person. Normal mood and affect. Assessment:      1. Morbid obesity with BMI of 50.0-59.9, adult ([facility])  phentermine (ADIPEX-P) 37.5 MG tablet   2. Needs flu shot  INFLUENZA, QUADV, 3 YRS AND OLDER, IM, MDV, 0.5ML (FLUZONE QUADV)   3. Left knee tendonitis         Plan:   I discussed with patient in length that she needs to lose weight. Her joints, especially knees and hips Will Get Worse. I Discussed with Her Weight Loss Surgery. She States That She Wants to Start Adipex again. First Prescription of Adipex Provided. Patient will continue current diet and exercise regimen. I discussed with her to exercise at least 30 minutes 5 times a week. Decrease carbohydrate intake. Increase fibers and protein. See me back in 4 weeks for weight check. Call if any changes. Stop Adipex if you have any side effects. Call or return to clinic prn if these symptoms worsen or fail to improve as anticipated. I have reviewed the instructions with the patient, answering all questions to her satisfaction. Return in about 4 weeks (around 12/6/2017), or if symptoms worsen or fail to improve. Orders Placed This Encounter   Procedures    INFLUENZA, QUADV, 3 YRS AND OLDER, IM, MDV, 0.5ML (FLUZONE QUADV)     Orders Placed This Encounter   Medications    phentermine (ADIPEX-P) 37.5 MG tablet     Sig: Take 1 tablet by mouth every morning (before breakfast) .      Dispense:  30 tablet     Refill:  0       Electronically signed by Bubba Mccall MD on 11/8/2017 at 12:50 PM       (Please note that portions of this note were completed with a voice recognition program. Efforts were made to edit the dictations but occasionally words are mis-transcribed.)

## 2017-11-09 DIAGNOSIS — E66.01 MORBID OBESITY WITH BMI OF 50.0-59.9, ADULT (HCC): ICD-10-CM

## 2017-11-09 RX ORDER — PHENTERMINE HYDROCHLORIDE 37.5 MG/1
37.5 TABLET ORAL
Qty: 30 TABLET | Refills: 0 | Status: SHIPPED | OUTPATIENT
Start: 2017-11-09 | End: 2017-12-09

## 2017-11-10 RX ORDER — PHENTERMINE HYDROCHLORIDE 37.5 MG/1
37.5 TABLET ORAL
Qty: 30 TABLET | Refills: 0 | OUTPATIENT
Start: 2017-11-10 | End: 2017-12-10

## 2017-12-13 ENCOUNTER — OFFICE VISIT (OUTPATIENT)
Dept: FAMILY MEDICINE CLINIC | Age: 37
End: 2017-12-13
Payer: MEDICARE

## 2017-12-13 VITALS
DIASTOLIC BLOOD PRESSURE: 88 MMHG | RESPIRATION RATE: 16 BRPM | TEMPERATURE: 98.2 F | SYSTOLIC BLOOD PRESSURE: 138 MMHG | HEIGHT: 63 IN | HEART RATE: 74 BPM | WEIGHT: 293 LBS | OXYGEN SATURATION: 99 % | BODY MASS INDEX: 51.91 KG/M2

## 2017-12-13 DIAGNOSIS — E66.01 MORBID OBESITY WITH BMI OF 50.0-59.9, ADULT (HCC): Primary | ICD-10-CM

## 2017-12-13 PROCEDURE — 99213 OFFICE O/P EST LOW 20 MIN: CPT | Performed by: FAMILY MEDICINE

## 2017-12-13 PROCEDURE — G8427 DOCREV CUR MEDS BY ELIG CLIN: HCPCS | Performed by: FAMILY MEDICINE

## 2017-12-13 PROCEDURE — 1036F TOBACCO NON-USER: CPT | Performed by: FAMILY MEDICINE

## 2017-12-13 PROCEDURE — G8417 CALC BMI ABV UP PARAM F/U: HCPCS | Performed by: FAMILY MEDICINE

## 2017-12-13 PROCEDURE — G8484 FLU IMMUNIZE NO ADMIN: HCPCS | Performed by: FAMILY MEDICINE

## 2017-12-13 RX ORDER — PHENTERMINE HYDROCHLORIDE 37.5 MG/1
37.5 TABLET ORAL
Qty: 30 TABLET | Refills: 0 | Status: SHIPPED | OUTPATIENT
Start: 2017-12-13 | End: 2018-01-12 | Stop reason: SDUPTHER

## 2017-12-13 NOTE — LETTER
[address] [facility] [address] [facility] [address]  Phone: [phone]  Fax: [phone]      December 13, 2017      [address]      Dear Gissell Cottrell: In addition to helping you feel better when you are sick, we are interested in preventing illness and injury in the first place. In the spirit of maintaining your good health, our system indicates that you are due for the following:    Lab Frequency   EMG Once   Insulin, total Once   Testosterone Once   Estrogens, Fractionated Once   DHEA-Sulfate Once   KARTHIK Digital Screen Bilateral Once   Hemoglobin A1C Once       Gabbie Vaughan MD previously ordered testing for you. As of this date we have not received results of your tests. Please call to let us know if testing as been done or when you plan to have the test performed. If you should have any questions or need paper orders for the above tests and/or labs, please contact our office.         Thank you,  Your Care Team

## 2017-12-13 NOTE — PROGRESS NOTES
by mouth daily 30 tablet 3    Omega-3 Fatty Acids (FISH OIL BURP-LESS) 1200 MG CAPS       hydrOXYzine (VISTARIL) 25 MG capsule Take 25 mg by mouth       No current facility-administered medications for this visit. ALLERGIES:    Allergies   Allergen Reactions    Dairycare [Lactase-Lactobacillus] Diarrhea    Amoxicillin Rash    Penicillins Rash       Social History   Substance Use Topics    Smoking status: Never Smoker    Smokeless tobacco: Never Used    Alcohol use No      Body mass index is 55.71 kg/m². /88   Pulse 74   Temp 98.2 °F (36.8 °C)   Resp 16   Ht 5' 2.99\" (1.6 m)   Wt (!) 314 lb 6.4 oz (142.6 kg)   SpO2 99%   BMI 55.71 kg/m²     Subjective:      HPI  40-year-old morbid morbidly weeks female coming in today for weight check. Patient did not gain any weight, but she did not lose any weight. She tells me that she has been walking more. She called set shopping, walking. She has been eating better, much more salads, fruits smaller portions. She feels overall better with her diet changes. No pop, not a lot of carbs, no a lot of fast foods. She denies any side effects. She just finished a weight loss management, diet intervention. She tells me that she does not want to have weight loss surgery. Review of Systems   Constitutional: Negative for fever and unexpected weight change. Respiratory: Negative for cough and shortness of breath. Cardiovascular: Negative for chest pain and leg swelling. Skin: Negative for color change and rash. Objective:   Physical Exam  Constitutional: VS (see above). General appearance: normal development, habitus and attention, no deformities. Eyes: normal conjunctiva and lids. CAV: RRR, no RMG. No edema lower extremities. Pulmo: CTA bilateral, no CWR. Skin: no rashes, lesions or ulcers. Musculoskeletal: normal gait. Nails: no clubbing or cyanosis. Psychiatric: alert and oriented to place, time and person.  Normal mood and

## 2018-01-12 ENCOUNTER — HOSPITAL ENCOUNTER (OUTPATIENT)
Age: 38
Setting detail: SPECIMEN
Discharge: HOME | End: 2018-01-12
Payer: MEDICARE

## 2018-01-12 ENCOUNTER — OFFICE VISIT (OUTPATIENT)
Dept: FAMILY MEDICINE CLINIC | Age: 38
End: 2018-01-12
Payer: MEDICARE

## 2018-01-12 VITALS
DIASTOLIC BLOOD PRESSURE: 82 MMHG | HEART RATE: 84 BPM | SYSTOLIC BLOOD PRESSURE: 130 MMHG | OXYGEN SATURATION: 100 % | TEMPERATURE: 98.1 F | HEIGHT: 63 IN | BODY MASS INDEX: 51.91 KG/M2 | WEIGHT: 293 LBS | RESPIRATION RATE: 16 BRPM

## 2018-01-12 DIAGNOSIS — R35.0 INCREASED URINARY FREQUENCY: ICD-10-CM

## 2018-01-12 DIAGNOSIS — Z12.31 SCREENING MAMMOGRAM, ENCOUNTER FOR: ICD-10-CM

## 2018-01-12 DIAGNOSIS — R73.03 PRE-DIABETES: ICD-10-CM

## 2018-01-12 DIAGNOSIS — E66.01 MORBID OBESITY WITH BMI OF 50.0-59.9, ADULT (HCC): ICD-10-CM

## 2018-01-12 DIAGNOSIS — Z01.419 ENCOUNTER FOR GYNECOLOGICAL EXAMINATION WITHOUT ABNORMAL FINDING: ICD-10-CM

## 2018-01-12 DIAGNOSIS — E66.01 MORBID OBESITY WITH BMI OF 50.0-59.9, ADULT (HCC): Primary | ICD-10-CM

## 2018-01-12 LAB
DHEAS (DHEA SULFATE): 50.9 UG/DL (ref 45–270)
ESTIMATED AVERAGE GLUCOSE: 117 MG/DL
HBA1C MFR BLD: 5.7 % (ref 4–6)
INSULIN COMMENT: NORMAL
INSULIN REFERENCE RANGE:: NORMAL
INSULIN: 127.1 MU/L
TESTOSTERONE TOTAL: 26 NG/DL (ref 20–70)

## 2018-01-12 PROCEDURE — G8417 CALC BMI ABV UP PARAM F/U: HCPCS | Performed by: FAMILY MEDICINE

## 2018-01-12 PROCEDURE — 1036F TOBACCO NON-USER: CPT | Performed by: FAMILY MEDICINE

## 2018-01-12 PROCEDURE — G8427 DOCREV CUR MEDS BY ELIG CLIN: HCPCS | Performed by: FAMILY MEDICINE

## 2018-01-12 PROCEDURE — 99213 OFFICE O/P EST LOW 20 MIN: CPT | Performed by: FAMILY MEDICINE

## 2018-01-12 PROCEDURE — G8484 FLU IMMUNIZE NO ADMIN: HCPCS | Performed by: FAMILY MEDICINE

## 2018-01-12 RX ORDER — PHENTERMINE HYDROCHLORIDE 37.5 MG/1
37.5 TABLET ORAL
Qty: 30 TABLET | Refills: 0 | Status: SHIPPED | OUTPATIENT
Start: 2018-01-12 | End: 2018-02-11

## 2018-01-12 RX ORDER — METFORMIN HYDROCHLORIDE 500 MG/1
500 TABLET, EXTENDED RELEASE ORAL 2 TIMES DAILY
Qty: 60 TABLET | Refills: 3 | Status: SHIPPED | OUTPATIENT
Start: 2018-01-12 | End: 2018-01-12 | Stop reason: SDUPTHER

## 2018-01-12 RX ORDER — METFORMIN HYDROCHLORIDE 500 MG/1
500 TABLET, EXTENDED RELEASE ORAL 2 TIMES DAILY
Qty: 60 TABLET | Refills: 3 | Status: SHIPPED | OUTPATIENT
Start: 2018-01-12 | End: 2018-05-22 | Stop reason: SDUPTHER

## 2018-01-12 NOTE — PROGRESS NOTES
aspirin 81 MG chewable tablet Take 1 tablet by mouth daily 30 tablet 3    Omega-3 Fatty Acids (FISH OIL BURP-LESS) 1200 MG CAPS       hydrOXYzine (VISTARIL) 25 MG capsule Take 25 mg by mouth       No current facility-administered medications for this visit. ALLERGIES:    Allergies   Allergen Reactions    Dairycare [Lactase-Lactobacillus] Diarrhea    Amoxicillin Rash    Penicillins Rash       Social History   Substance Use Topics    Smoking status: Never Smoker    Smokeless tobacco: Never Used    Alcohol use No      Body mass index is 55.81 kg/m². /82   Pulse 84   Temp 98.1 °F (36.7 °C)   Resp 16   Ht 5' 2.99\" (1.6 m)   Wt (!) 315 lb (142.9 kg)   SpO2 100%   BMI 55.81 kg/m²     Subjective:      HPI  40-year-old morbidly obese female coming in today for recheck. Patient states that she change diet. no pop. Small portions. She hasn't been using the Adipex back daily, but she feels that she is doing overall better. She states that she is trying to exercise. Patient was seen by weight management. She does not want to have her weight loss surgery. She tells me that she can go back to every week to recheck her weight. She denies any side effects of the Adipex. She feels that she lost more weight on metformin, but she has a small rash then. Is also tells me that she feels very depressed. She's been seeing a psychiatrist, however. She missed her last appointment. She's been taking Effexor increased doses now. But she feels she is very depressed. Review of Systems   Constitutional: Negative for fever and unexpected weight change. positive for depression. Respiratory: Negative for cough and shortness of breath. Cardiovascular: Negative for chest pain and leg swelling. Gastrointestinal: Negative for diarrhea, constipation and blood in stool. Skin: Negative for color change and rash. Objective:   Physical Exam  Constitutional: VS (see above).    General appearance: normal development, habitus and attention, no deformities. Eyes: normal conjunctiva and lids. CAV: RRR, no RMG. No edema lower extremities. Pulmo: CTA bilateral, no CWR. Skin: no rashes, lesions or ulcers. Musculoskeletal: normal gait. Nails: no clubbing or cyanosis. Psychiatric: alert and oriented to place, time and person. Normal mood and affect. Assessment:      1. Morbid obesity with BMI of 50.0-59.9, adult ([facility]     2. Increased urinary frequency  metFORMIN (GLUCOPHAGE XR) 500 MG extended release tablet       Plan:   3rd script of Adipex provided. Patient will continue current diet and exercise regimen. I discussed with her to exercise at least 30 minutes 5 times a week. Decrease carbohydrate intake. Increase fibers and protein. See me back in 8 weeks for weight check. Call if any changes. Wants to lose 5 lbs in the next visit. Stop Adipex if you have any side effects. Call or return to clinic prn if these symptoms worsen or fail to improve as anticipated. I have reviewed the instructions with the patient, answering all questions to her satisfaction. Return in about 8 weeks (around 3/9/2018), or if symptoms worsen or fail to improve, for weight. No orders of the defined types were placed in this encounter. Orders Placed This Encounter   Medications    phentermine (ADIPEX-P) 37.5 MG tablet     Sig: Take 1 tablet by mouth every morning (before breakfast) for 30 days.      Dispense:  30 tablet     Refill:  0    metFORMIN (GLUCOPHAGE XR) 500 MG extended release tablet     Sig: Take 1 tablet by mouth 2 times daily     Dispense:  60 tablet     Refill:  3       Electronically signed by Mihaela Vargas MD on 1/12/2018 at 11:46 AM       (Please note that portions of this note were completed with a voice recognition program. Efforts were made to edit the dictations but occasionally words are mis-transcribed.)

## 2018-01-17 LAB
ESTRADIOL LEVEL: 73.6 PG/ML
ESTROGEN TOTAL: 100 PG/ML
ESTRONE: 26.4 PG/ML

## 2018-06-08 ENCOUNTER — OFFICE VISIT (OUTPATIENT)
Dept: FAMILY MEDICINE CLINIC | Age: 38
End: 2018-06-08
Payer: MEDICARE

## 2018-06-08 VITALS
SYSTOLIC BLOOD PRESSURE: 133 MMHG | DIASTOLIC BLOOD PRESSURE: 84 MMHG | OXYGEN SATURATION: 97 % | HEART RATE: 77 BPM | WEIGHT: 293 LBS | RESPIRATION RATE: 16 BRPM | BODY MASS INDEX: 57.59 KG/M2

## 2018-06-08 DIAGNOSIS — D68.9 BLOOD CLOTTING DISORDER (HCC): ICD-10-CM

## 2018-06-08 DIAGNOSIS — L08.9 SKIN INFECTION, BACTERIAL: ICD-10-CM

## 2018-06-08 DIAGNOSIS — B96.89 SKIN INFECTION, BACTERIAL: ICD-10-CM

## 2018-06-08 DIAGNOSIS — H10.32 ACUTE BACTERIAL CONJUNCTIVITIS OF LEFT EYE: ICD-10-CM

## 2018-06-08 DIAGNOSIS — R20.2 NUMBNESS AND TINGLING OF RIGHT ARM: Primary | ICD-10-CM

## 2018-06-08 DIAGNOSIS — R20.0 NUMBNESS AND TINGLING OF RIGHT ARM: Primary | ICD-10-CM

## 2018-06-08 PROCEDURE — 99214 OFFICE O/P EST MOD 30 MIN: CPT | Performed by: FAMILY MEDICINE

## 2018-06-08 PROCEDURE — 1036F TOBACCO NON-USER: CPT | Performed by: FAMILY MEDICINE

## 2018-06-08 PROCEDURE — G8417 CALC BMI ABV UP PARAM F/U: HCPCS | Performed by: FAMILY MEDICINE

## 2018-06-08 PROCEDURE — G8427 DOCREV CUR MEDS BY ELIG CLIN: HCPCS | Performed by: FAMILY MEDICINE

## 2018-06-08 RX ORDER — KETOROLAC TROMETHAMINE 10 MG/1
10 TABLET, FILM COATED ORAL EVERY 6 HOURS PRN
COMMUNITY

## 2018-06-08 RX ORDER — POLYMYXIN B SULFATE AND TRIMETHOPRIM 1; 10000 MG/ML; [USP'U]/ML
1 SOLUTION OPHTHALMIC EVERY 6 HOURS
Qty: 1 BOTTLE | Refills: 2 | Status: SHIPPED | OUTPATIENT
Start: 2018-06-08 | End: 2018-06-18

## 2018-06-08 ASSESSMENT — PATIENT HEALTH QUESTIONNAIRE - PHQ9
1. LITTLE INTEREST OR PLEASURE IN DOING THINGS: 0
SUM OF ALL RESPONSES TO PHQ9 QUESTIONS 1 & 2: 1
2. FEELING DOWN, DEPRESSED OR HOPELESS: 1
SUM OF ALL RESPONSES TO PHQ QUESTIONS 1-9: 1

## 2018-06-27 ENCOUNTER — TELEPHONE (OUTPATIENT)
Dept: FAMILY MEDICINE CLINIC | Age: 38
End: 2018-06-27

## 2018-06-27 DIAGNOSIS — R20.2 NUMBNESS AND TINGLING IN RIGHT HAND: Primary | ICD-10-CM

## 2018-06-27 DIAGNOSIS — R20.0 NUMBNESS AND TINGLING IN RIGHT HAND: Primary | ICD-10-CM

## 2018-07-25 ENCOUNTER — HOSPITAL ENCOUNTER (OUTPATIENT)
Age: 38
Setting detail: SPECIMEN
Discharge: HOME | End: 2018-07-25
Payer: MEDICARE

## 2018-07-25 ENCOUNTER — OFFICE VISIT (OUTPATIENT)
Dept: OBGYN CLINIC | Age: 38
End: 2018-07-25
Payer: MEDICARE

## 2018-07-25 VITALS
WEIGHT: 293 LBS | BODY MASS INDEX: 56.38 KG/M2 | SYSTOLIC BLOOD PRESSURE: 118 MMHG | HEART RATE: 94 BPM | DIASTOLIC BLOOD PRESSURE: 83 MMHG

## 2018-07-25 DIAGNOSIS — B37.2 YEAST INFECTION OF THE SKIN: ICD-10-CM

## 2018-07-25 DIAGNOSIS — Z01.419 WOMEN'S ANNUAL ROUTINE GYNECOLOGICAL EXAMINATION: Primary | ICD-10-CM

## 2018-07-25 PROCEDURE — 99395 PREV VISIT EST AGE 18-39: CPT | Performed by: OBSTETRICS & GYNECOLOGY

## 2018-07-25 RX ORDER — NYSTATIN 100000 [USP'U]/G
POWDER TOPICAL
Qty: 1 BOTTLE | Refills: 1 | Status: SHIPPED | OUTPATIENT
Start: 2018-07-25

## 2018-07-25 RX ORDER — FLUCONAZOLE 150 MG/1
150 TABLET ORAL
Qty: 3 TABLET | Refills: 0 | Status: SHIPPED | OUTPATIENT
Start: 2018-07-25

## 2018-07-25 ASSESSMENT — ENCOUNTER SYMPTOMS
SHORTNESS OF BREATH: 0
ABDOMINAL PAIN: 0
COUGH: 0

## 2018-07-25 NOTE — PROGRESS NOTES
chewable tablet Take 1 tablet by mouth daily 30 tablet 3    Omega-3 Fatty Acids (FISH OIL BURP-LESS) 1200 MG CAPS       hydrOXYzine (VISTARIL) 25 MG capsule Take 25 mg by mouth       No current facility-administered medications for this visit. Allergies: Allergies   Allergen Reactions    Dairycare [Lactase-Lactobacillus] Diarrhea    Amoxicillin Rash    Penicillins Rash     Past Medical History:   Past Medical History:   Diagnosis Date    Anxiety     [facility]     Depression     Diabetes ([facility]     Varicose vein of leg      Past Surgical History:   Past Surgical History:   Procedure Laterality Date     SECTION      COLONOSCOPY  2016    TONSILLECTOMY       Obstetric History:   4  Para 2  Gynecologic History: LMP 18   Duration 4-5 d    Interval q  30-34 d  Tampons/Pads in a day: 6-8  Last Pap: 3/9/17       Any history of abnormal paps no    PriorColpo/Biopsy n/a     Last Mammogram n/a   Result  Contraception: BTL  Complications: none  STDs: none  Psychosocial History: Occupation:   Stay at home mom   Caffeine Yes    At risk for depression Yes    Abuse:   No  Seatbelt:   Yes  Exercise:  No    Social History     Social History    Marital status:      Spouse name: N/A    Number of children: N/A    Years of education: N/A     Occupational History          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 HENT: Negative for hearing loss. Respiratory: Negative for cough and shortness of breath.     Cardiovascular: Negative for chest pain and year    Napoleon Grullon MD  [address]

## 2018-07-26 ENCOUNTER — OFFICE VISIT (OUTPATIENT)
Dept: FAMILY MEDICINE CLINIC | Age: 38
End: 2018-07-26
Payer: MEDICARE

## 2018-07-26 VITALS
HEART RATE: 94 BPM | HEIGHT: 63 IN | DIASTOLIC BLOOD PRESSURE: 85 MMHG | WEIGHT: 293 LBS | OXYGEN SATURATION: 95 % | SYSTOLIC BLOOD PRESSURE: 125 MMHG | BODY MASS INDEX: 51.91 KG/M2

## 2018-07-26 DIAGNOSIS — B37.2 CANDIDAL SKIN INFECTION: Primary | ICD-10-CM

## 2018-07-26 DIAGNOSIS — E66.01 MORBID OBESITY WITH BMI OF 50.0-59.9, ADULT (HCC): ICD-10-CM

## 2018-07-26 LAB
ALT SERPL-CCNC: 30 U/L
AST SERPL-CCNC: 20 U/L
T4 FREE: 0.97
TSH SERPL DL<=0.05 MIU/L-ACNC: 1.13 UIU/ML

## 2018-07-26 PROCEDURE — G8417 CALC BMI ABV UP PARAM F/U: HCPCS | Performed by: FAMILY MEDICINE

## 2018-07-26 PROCEDURE — 99213 OFFICE O/P EST LOW 20 MIN: CPT | Performed by: FAMILY MEDICINE

## 2018-07-26 PROCEDURE — 1036F TOBACCO NON-USER: CPT | Performed by: FAMILY MEDICINE

## 2018-07-26 PROCEDURE — G8427 DOCREV CUR MEDS BY ELIG CLIN: HCPCS | Performed by: FAMILY MEDICINE

## 2018-07-26 RX ORDER — PHENTERMINE HYDROCHLORIDE 37.5 MG/1
37.5 TABLET ORAL
Qty: 30 TABLET | Refills: 0 | Status: SHIPPED | OUTPATIENT
Start: 2018-07-26 | End: 2018-08-25

## 2018-07-26 RX ORDER — GABAPENTIN 100 MG/1
100 CAPSULE ORAL 3 TIMES DAILY
COMMUNITY

## 2018-07-26 NOTE — PROGRESS NOTES
General FM note    Ayah Earl is a 41 y.o. female who presents today for follow up on her  medical conditions as noted below. Ayah Earl is c/o of   Chief Complaint   Patient presents with    Discuss Medications     phentermine, wants to discuss diet        Patient Active Problem List:     Morbid obesity with BMI of 50.0-59.9, adult ([facility]     Prediabetes     Anxiety and depression     Varicose veins of legs     Blood clotting disorder (HCC)     Radicular low back pain     Learning disability     Hx of migraines     Past Medical History:   Diagnosis Date    Anxiety     Blood clotting disorder ([facility]     Depression     [facility]     Varicose vein of leg       Past Surgical History:   Procedure Laterality Date     SECTION      COLONOSCOPY  2016    TONSILLECTOMY       Family History   Problem Relation Age of Onset    Adopted: Yes     Current Outpatient Prescriptions   Medication Sig Dispense Refill    gabapentin (NEURONTIN) 100 MG capsule Take 100 mg by mouth 3 times daily. Annalisa Paredes phentermine (ADIPEX-P) 37.5 MG tablet Take 1 tablet by mouth every morning (before breakfast) for 30 days. . 30 tablet 0    nystatin (MYCOSTATIN) 321807 UNIT/GM powder Apply 3 times daily. 1 Bottle 1    fluconazole (DIFLUCAN) 150 MG tablet Take 1 tablet by mouth every 72 hours 3 tablet 0    Elastic Bandages & Supports (SPLINT WRIST BRACE/LEFT-RIGHT) MISC 1 Device by Does not apply route nightly Right wrist 1 each 0    ketorolac (TORADOL) 10 MG tablet Take 10 mg by mouth every 6 hours as needed for Pain      Ufmln-Xuwos-Nncisvg-Pramoxine (TRIPLE ANTIBIOTIC PLUS) 1 % OINT Apply 1 each topically 3 times daily 28 g 2    metoprolol succinate (TOPROL XL) 50 MG extended release tablet TAKE 1 TABLET BY MOUTH EVERY NIGHT AT BEDTIME 30 tablet 3    metFORMIN (GLUCOPHAGE-XR) 500 MG extended release tablet TAKE 1 TABLET BY MOUTH TWICE A DAY 60 tablet 3    clonazePAM (KLONOPIN) 0.5 MG tablet Take 0.5 mg by mouth .

## 2018-07-27 LAB
HPV SAMPLE: NORMAL
HPV SOURCE: NORMAL
HPV, GENOTYPE 16: NOT DETECTED
HPV, GENOTYPE 18: NOT DETECTED
HPV, HIGH RISK OTHER: NOT DETECTED
HPV, INTERPRETATION: NORMAL

## 2018-07-30 DIAGNOSIS — B37.2 CANDIDAL SKIN INFECTION: ICD-10-CM

## 2018-07-31 DIAGNOSIS — R20.2 NUMBNESS AND TINGLING OF RIGHT ARM: ICD-10-CM

## 2018-07-31 DIAGNOSIS — R20.0 NUMBNESS AND TINGLING OF RIGHT ARM: ICD-10-CM

## 2018-08-09 LAB — CYTOLOGY REPORT: NORMAL

## 2018-08-20 ENCOUNTER — OFFICE VISIT (OUTPATIENT)
Dept: FAMILY MEDICINE CLINIC | Age: 38
End: 2018-08-20
Payer: MEDICARE

## 2018-08-20 VITALS
RESPIRATION RATE: 16 BRPM | WEIGHT: 293 LBS | HEART RATE: 93 BPM | DIASTOLIC BLOOD PRESSURE: 88 MMHG | OXYGEN SATURATION: 97 % | SYSTOLIC BLOOD PRESSURE: 143 MMHG | BODY MASS INDEX: 56.77 KG/M2

## 2018-08-20 DIAGNOSIS — G56.03 BILATERAL CARPAL TUNNEL SYNDROME: Primary | ICD-10-CM

## 2018-08-20 PROCEDURE — G8417 CALC BMI ABV UP PARAM F/U: HCPCS | Performed by: FAMILY MEDICINE

## 2018-08-20 PROCEDURE — 1036F TOBACCO NON-USER: CPT | Performed by: FAMILY MEDICINE

## 2018-08-20 PROCEDURE — G8427 DOCREV CUR MEDS BY ELIG CLIN: HCPCS | Performed by: FAMILY MEDICINE

## 2018-08-20 PROCEDURE — 99213 OFFICE O/P EST LOW 20 MIN: CPT | Performed by: FAMILY MEDICINE

## 2018-08-20 NOTE — PROGRESS NOTES
tablet Take 0.5 mg by mouth .  ibuprofen (ADVIL;MOTRIN) 800 MG tablet Take 800 mg by mouth      tiZANidine (ZANAFLEX) 4 MG tablet Take 4 mg by mouth      venlafaxine (EFFEXOR XR) 150 MG extended release capsule Take 150 mg by mouth daily      venlafaxine (EFFEXOR) 37.5 MG tablet Take 37.5 mg by mouth 3 times daily      naproxen (NAPROSYN) 500 MG tablet Take 1 tablet by mouth 2 times daily (with meals) 60 tablet 3    Cholecalciferol (VITAMIN D3) 5000 UNITS TABS Take 1 tablet by mouth daily      TRUE METRIX BLOOD GLUCOSE TEST strip       PHARMACIST CHOICE ALCOHOL 70 % PADS       Lancet Devices (SIMPLE DIAGNOSTICS LANCING DEV) MISC       PHARMACIST CHOICE LANCETS [facility]       aspirin 81 MG chewable tablet Take 1 tablet by mouth daily 30 tablet 3    Omega-3 Fatty Acids (FISH OIL BURP-LESS) 1200 MG CAPS       hydrOXYzine (VISTARIL) 25 MG capsule Take 25 mg by mouth       No current facility-administered medications for this visit. ALLERGIES:    Allergies   Allergen Reactions    Dairycare [Lactase-Lactobacillus] Diarrhea    Amoxicillin Rash    Penicillins Rash       Social History   Substance Use Topics    Smoking status: Never Smoker    Smokeless tobacco: Never Used    Alcohol use No      Body mass index is 56.77 kg/m². BP (!) 143/88   Pulse 93   Resp 16   Wt (!) 320 lb 6.4 oz (145.3 kg)   LMP 07/09/2018   SpO2 97%   BMI 56.77 kg/m²     Subjective:      HPI  69-year-old morbid morbid obese female coming in today to review her recent EMG. Patient states that her right hand goes numb, especially during the night. That she has increased swelling. Review of Systems   Constitutional: Negative for fever and unexpected weight change. Respiratory: Negative for cough and shortness of breath. Cardiovascular: Negative for chest pain and leg swelling. Gastrointestinal: Negative for diarrhea, constipation and blood in stool. Musculoskeletal: Negative for back pain and gait problem.

## 2020-12-15 ENCOUNTER — DASHBOARD ENCOUNTERS (OUTPATIENT)
Age: 40
End: 2020-12-15

## 2020-12-15 ENCOUNTER — WEB ENCOUNTER (OUTPATIENT)
Dept: URBAN - METROPOLITAN AREA CLINIC 94 | Facility: CLINIC | Age: 40
End: 2020-12-15

## 2020-12-15 ENCOUNTER — OFFICE VISIT (OUTPATIENT)
Dept: URBAN - METROPOLITAN AREA CLINIC 94 | Facility: CLINIC | Age: 40
End: 2020-12-15
Payer: COMMERCIAL

## 2020-12-15 DIAGNOSIS — K92.1 RECTAL BLEEDING: ICD-10-CM

## 2020-12-15 DIAGNOSIS — Z86.010 HISTORY OF COLON POLYPS: ICD-10-CM

## 2020-12-15 PROCEDURE — G8417 CALC BMI ABV UP PARAM F/U: HCPCS | Performed by: INTERNAL MEDICINE

## 2020-12-15 PROCEDURE — G8427 DOCREV CUR MEDS BY ELIG CLIN: HCPCS | Performed by: INTERNAL MEDICINE

## 2020-12-15 PROCEDURE — G9903 PT SCRN TBCO ID AS NON USER: HCPCS | Performed by: INTERNAL MEDICINE

## 2020-12-15 PROCEDURE — 99203 OFFICE O/P NEW LOW 30 MIN: CPT | Performed by: INTERNAL MEDICINE

## 2020-12-15 RX ORDER — CLONAZEPAM 0.5 MG/1
1 TABLET AT BEDTIME TABLET ORAL ONCE A DAY
Status: ACTIVE | COMMUNITY

## 2020-12-15 RX ORDER — VENLAFAXINE HYDROCHLORIDE 75 MG/1
1 CAPSULE WITH FOOD CAPSULE, EXTENDED RELEASE ORAL ONCE A DAY
Status: ACTIVE | COMMUNITY

## 2020-12-15 NOTE — HPI-TODAY'S VISIT:
Pt states that she had an episode of lower abdominal pain in 10/2020. CT A/P showed fatty liver , otherwise negative. Pt states that pain has resolved now. BMs are soft, 2-3/day, which is normal for her, denies any change in bowel habits. Pt states that she had a single episode of rectal bleeding a few weeks ago. Since then , she has not noted any rectal bleeding. Pt states that she had a colonoscopy ~ 6 years ago in [place] and a polyp was reportedly removed.

## 2020-12-15 NOTE — PHYSICAL EXAM CONSTITUTIONAL:
----- Message from Michael Lewis MD sent at 11/4/2020 11:27 PM CST -----  Results indicate benign appearing mammogram. Repeat screening in 1-2 years please.    Please notify the patient of the results and recommendations.    Thank you,    Michael Lewis MD  11/4/2020  11:27 PM     well developed, well nourished , in no acute distress , ambulating without difficulty , normal communication ability

## 2021-01-08 ENCOUNTER — OFFICE VISIT (OUTPATIENT)
Dept: URBAN - METROPOLITAN AREA MEDICAL CENTER 34 | Facility: MEDICAL CENTER | Age: 41
End: 2021-01-08

## 2021-02-02 ENCOUNTER — TELEPHONE ENCOUNTER (OUTPATIENT)
Dept: URBAN - METROPOLITAN AREA CLINIC 94 | Facility: CLINIC | Age: 41
End: 2021-02-02

## 2021-02-03 ENCOUNTER — OFFICE VISIT (OUTPATIENT)
Dept: URBAN - METROPOLITAN AREA SURGERY CENTER 17 | Facility: SURGERY CENTER | Age: 41
End: 2021-02-03

## 2021-03-26 ENCOUNTER — OFFICE VISIT (OUTPATIENT)
Dept: URBAN - METROPOLITAN AREA SURGERY CENTER 17 | Facility: SURGERY CENTER | Age: 41
End: 2021-03-26

## 2021-04-02 ENCOUNTER — OFFICE VISIT (OUTPATIENT)
Dept: URBAN - METROPOLITAN AREA MEDICAL CENTER 34 | Facility: MEDICAL CENTER | Age: 41
End: 2021-04-02

## 2021-04-16 PROBLEM — 428283002 HISTORY OF POLYP OF COLON: Status: ACTIVE | Noted: 2020-12-15

## 2021-04-20 ENCOUNTER — OFFICE VISIT (OUTPATIENT)
Dept: URBAN - METROPOLITAN AREA MEDICAL CENTER 34 | Facility: MEDICAL CENTER | Age: 41
End: 2021-04-20
Payer: COMMERCIAL

## 2021-04-20 DIAGNOSIS — Z86.010 H/O ADENOMATOUS POLYP OF COLON: ICD-10-CM

## 2021-04-20 PROCEDURE — G0105 COLORECTAL SCRN; HI RISK IND: HCPCS | Performed by: INTERNAL MEDICINE

## 2022-05-06 ENCOUNTER — APPOINTMENT (RX ONLY)
Dept: URBAN - METROPOLITAN AREA CLINIC 41 | Facility: CLINIC | Age: 42
Setting detail: DERMATOLOGY
End: 2022-05-06

## 2022-05-06 DIAGNOSIS — L28.0 LICHEN SIMPLEX CHRONICUS: ICD-10-CM

## 2022-05-06 PROCEDURE — ? PRESCRIPTION

## 2022-05-06 PROCEDURE — 99203 OFFICE O/P NEW LOW 30 MIN: CPT

## 2022-05-06 PROCEDURE — ? COUNSELING

## 2022-05-06 PROCEDURE — ? TREATMENT REGIMEN

## 2022-05-06 PROCEDURE — ? MEDICATION COUNSELING

## 2022-05-06 RX ORDER — ALCLOMETASONE DIPROPIONATE 0.5 MG/G
OINTMENT TOPICAL BID
Qty: 45 | Refills: 0 | Status: ERX | COMMUNITY
Start: 2022-05-06

## 2022-05-06 RX ORDER — TACROLIMUS 1 MG/G
OINTMENT TOPICAL QD
Qty: 30 | Refills: 0 | Status: ERX | COMMUNITY
Start: 2022-05-06

## 2022-05-06 RX ADMIN — TACROLIMUS 1: 1 OINTMENT TOPICAL at 00:00

## 2022-05-06 RX ADMIN — ALCLOMETASONE DIPROPIONATE 1: 0.5 OINTMENT TOPICAL at 00:00

## 2022-05-06 ASSESSMENT — LOCATION ZONE DERM: LOCATION ZONE: VULVA

## 2022-05-06 ASSESSMENT — LOCATION SIMPLE DESCRIPTION DERM: LOCATION SIMPLE: VULVA

## 2022-05-06 ASSESSMENT — LOCATION DETAILED DESCRIPTION DERM: LOCATION DETAILED: RIGHT LABIA MAJORA

## 2022-05-06 NOTE — HPI: SKIN LESION
What Type Of Note Output Would You Prefer (Optional)?: Standard Output
How Severe Is Your Skin Lesion?: mild
Has Your Skin Lesion Been Treated?: not been treated
Is This A New Presentation, Or A Follow-Up?: Skin Lesion
Additional History: Pt has had the lesion biopsies and was told it was psoriasis. Pt states this was 20 years ago.

## 2022-05-06 NOTE — PROCEDURE: TREATMENT REGIMEN
Samples Given: -cetaphil gentle cleanser
Detail Level: Detailed
Plan: -if the lesion does not go away despite treatment, we will perform a biopsy and possibly patch test\\n\\nNo signs of psoriasis today\\n\\nDue to location of rash more potent topical steroids not appropriate\\n
Otc Regimen: -recommended pt only wash genital area with cetaphil or dove sensitive
Initiate Treatment: -Alclometasone ointment apply a thin layer to affected areas twice daily x 2 weeks\\n-Tacrolimus ointment apply a thin layer to affected area once daily x 4 weeks

## 2022-05-06 NOTE — PROCEDURE: MEDICATION COUNSELING
Dutasteride Male Counseling: Dustasteride Counseling:  I discussed with the patient the risks of use of dutasteride including but not limited to decreased libido, decreased ejaculate volume, and gynecomastia. Women who can become pregnant should not handle medication.  All of the patient's questions and concerns were addressed.
Drysol Pregnancy And Lactation Text: This medication is considered safe during pregnancy and breast feeding.
Itraconazole Pregnancy And Lactation Text: This medication is Pregnancy Category C and it isn't know if it is safe during pregnancy. It is also excreted in breast milk.
Xelfrankiez Pregnancy And Lactation Text: This medication is Pregnancy Category D and is not considered safe during pregnancy.  The risk during breast feeding is also uncertain.
Wartpeel Counseling:  I discussed with the patient the risks of Wartpeel including but not limited to erythema, scaling, itching, weeping, crusting, and pain.
Tranexamic Acid Pregnancy And Lactation Text: It is unknown if this medication is safe during pregnancy or breast feeding.
Topical Sulfur Applications Pregnancy And Lactation Text: This medication is Pregnancy Category C and has an unknown safety profile during pregnancy. It is unknown if this topical medication is excreted in breast milk.
Isotretinoin Pregnancy And Lactation Text: This medication is Pregnancy Category X and is considered extremely dangerous during pregnancy. It is unknown if it is excreted in breast milk.
Itraconazole Counseling:  I discussed with the patient the risks of itraconazole including but not limited to liver damage, nausea/vomiting, neuropathy, and severe allergy.  The patient understands that this medication is best absorbed when taken with acidic beverages such as non-diet cola or ginger ale.  The patient understands that monitoring is required including baseline LFTs and repeat LFTs at intervals.  The patient understands that they are to contact us or the primary physician if concerning signs are noted.
Erythromycin Pregnancy And Lactation Text: This medication is Pregnancy Category B and is considered safe during pregnancy. It is also excreted in breast milk.
Odomzo Counseling- I discussed with the patient the risks of Odomzo including but not limited to nausea, vomiting, diarrhea, constipation, weight loss, changes in the sense of taste, decreased appetite, muscle spasms, and hair loss.  The patient verbalized understanding of the proper use and possible adverse effects of Odomzo.  All of the patient's questions and concerns were addressed.
Infliximab Counseling:  I discussed with the patient the risks of infliximab including but not limited to myelosuppression, immunosuppression, autoimmune hepatitis, demyelinating diseases, lymphoma, and serious infections.  The patient understands that monitoring is required including a PPD at baseline and must alert us or the primary physician if symptoms of infection or other concerning signs are noted.
Picato Pregnancy And Lactation Text: This medication is Pregnancy Category C. It is unknown if this medication is excreted in breast milk.
Xolair Counseling:  Patient informed of potential adverse effects including but not limited to fever, muscle aches, rash and allergic reactions.  The patient verbalized understanding of the proper use and possible adverse effects of Xolair.  All of the patient's questions and concerns were addressed.
Elidel Counseling: Patient may experience a mild burning sensation during topical application. Elidel is not approved in children less than 2 years of age. There have been case reports of hematologic and skin malignancies in patients using topical calcineurin inhibitors although causality is questionable.
Rinvoq Counseling: I discussed with the patient the risks of Rinvoq therapy including but not limited to upper respiratory tract infections, shingles, cold sores, bronchitis, nausea, cough, fever, acne, and headache. Live vaccines should be avoided.  This medication has been linked to serious infections; higher rate of mortality; malignancy and lymphoproliferative disorders; major adverse cardiovascular events; thrombosis; thrombocytopenia, anemia, and neutropenia; lipid elevations; liver enzyme elevations; and gastrointestinal perforations.
High Dose Vitamin A Pregnancy And Lactation Text: High dose vitamin A therapy is contraindicated during pregnancy and breast feeding.
Wartpeel Pregnancy And Lactation Text: This medication is Pregnancy Category X and contraindicated in pregnancy and in women who may become pregnant. It is unknown if this medication is excreted in breast milk.
Ketoconazole Counseling:   Patient counseled regarding improving absorption with orange juice.  Adverse effects include but are not limited to breast enlargement, headache, diarrhea, nausea, upset stomach, liver function test abnormalities, taste disturbance, and stomach pain.  There is a rare possibility of liver failure that can occur when taking ketoconazole. The patient understands that monitoring of LFTs may be required, especially at baseline. The patient verbalized understanding of the proper use and possible adverse effects of ketoconazole.  All of the patient's questions and concerns were addressed.
Detail Level: Simple
Odomzo Pregnancy And Lactation Text: This medication is Pregnancy Category X and is absolutely contraindicated during pregnancy. It is unknown if it is excreted in breast milk.
Metronidazole Counseling:  I discussed with the patient the risks of metronidazole including but not limited to seizures, nausea/vomiting, a metallic taste in the mouth, nausea/vomiting and severe allergy.
Infliximab Pregnancy And Lactation Text: This medication is Pregnancy Category B and is considered safe during pregnancy. It is unknown if this medication is excreted in breast milk.
High Dose Vitamin A Counseling: Side effects reviewed, pt to contact office should one occur.
Protopic Counseling: Patient may experience a mild burning sensation during topical application. Protopic is not approved in children less than 2 years of age. There have been case reports of hematologic and skin malignancies in patients using topical calcineurin inhibitors although causality is questionable.
Dutasteride Pregnancy And Lactation Text: This medication is absolutely contraindicated in women, especially during pregnancy and breast feeding. Feminization of male fetuses is possible if taking while pregnant.
Winlevi Counseling:  I discussed with the patient the risks of topical clascoterone including but not limited to erythema, scaling, itching, and stinging. Patient voiced their understanding.
Ketoconazole Pregnancy And Lactation Text: This medication is Pregnancy Category C and it isn't know if it is safe during pregnancy. It is also excreted in breast milk and breast feeding isn't recommended.
Minocycline Counseling: Patient advised regarding possible photosensitivity and discoloration of the teeth, skin, lips, tongue and gums.  Patient instructed to avoid sunlight, if possible.  When exposed to sunlight, patients should wear protective clothing, sunglasses, and sunscreen.  The patient was instructed to call the office immediately if the following severe adverse effects occur:  hearing changes, easy bruising/bleeding, severe headache, or vision changes.  The patient verbalized understanding of the proper use and possible adverse effects of minocycline.  All of the patient's questions and concerns were addressed.
Rinvoq Pregnancy And Lactation Text: Based on animal studies, Rinvoq may cause embryo-fetal harm when administered to pregnant women.  The medication should not be used in pregnancy.  Breastfeeding is not recommended during treatment and for 6 days after the last dose.
Valtrex Pregnancy And Lactation Text: this medication is Pregnancy Category B and is considered safe during pregnancy. This medication is not directly found in breast milk but it's metabolite acyclovir is present.
Qbrexza Counseling:  I discussed with the patient the risks of Qbrexza including but not limited to headache, mydriasis, blurred vision, dry eyes, nasal dryness, dry mouth, dry throat, dry skin, urinary hesitation, and constipation.  Local skin reactions including erythema, burning, stinging, and itching can also occur.
Protopic Pregnancy And Lactation Text: This medication is Pregnancy Category C. It is unknown if this medication is excreted in breast milk when applied topically.
Valtrex Counseling: I discussed with the patient the risks of valacyclovir including but not limited to kidney damage, nausea, vomiting and severe allergy.  The patient understands that if the infection seems to be worsening or is not improving, they are to call.
Metronidazole Pregnancy And Lactation Text: This medication is Pregnancy Category B and considered safe during pregnancy.  It is also excreted in breast milk.
Xolair Pregnancy And Lactation Text: This medication is Pregnancy Category B and is considered safe during pregnancy. This medication is excreted in breast milk.
Erivedge Counseling- I discussed with the patient the risks of Erivedge including but not limited to nausea, vomiting, diarrhea, constipation, weight loss, changes in the sense of taste, decreased appetite, muscle spasms, and hair loss.  The patient verbalized understanding of the proper use and possible adverse effects of Erivedge.  All of the patient's questions and concerns were addressed.
Adbry Counseling: I discussed with the patient the risks of tralokinumab including but not limited to eye infection and irritation, cold sores, injection site reactions, worsening of asthma, allergic reactions and increased risk of parasitic infection.  Live vaccines should be avoided while taking tralokinumab. The patient understands that monitoring is required and they must alert us or the primary physician if symptoms of infection or other concerning signs are noted.
Oral Minoxidil Counseling- I discussed with the patient the risks of oral minoxidil including but not limited to shortness of breath, swelling of the feet or ankles, dizziness, lightheadedness, unwanted hair growth and allergic reaction.  The patient verbalized understanding of the proper use and possible adverse effects of oral minoxidil.  All of the patient's questions and concerns were addressed.
Minocycline Pregnancy And Lactation Text: This medication is Pregnancy Category D and not consider safe during pregnancy. It is also excreted in breast milk.
Use Enhanced Medication Counseling?: No
Winlevi Pregnancy And Lactation Text: This medication is considered safe during pregnancy and breastfeeding.
Cibinqo Counseling: I discussed with the patient the risks of Cibinqo therapy including but not limited to common cold, nausea, headache, cold sores, increased blood CPK levels, dizziness, UTIs, fatigue, acne, and vomitting. Live vaccines should be avoided.  This medication has been linked to serious infections; higher rate of mortality; malignancy and lymphoproliferative disorders; major adverse cardiovascular events; thrombosis; thrombocytopenia and lymphopenia; lipid elevations; and retinal detachment.
Oral Minoxidil Pregnancy And Lactation Text: This medication should only be used when clearly needed if you are pregnant, attempting to become pregnant or breast feeding.
Eucrisa Counseling: Patient may experience a mild burning sensation during topical application. Eucrisa is not approved in children less than 2 years of age.
Adbry Pregnancy And Lactation Text: It is unknown if this medication will adversely affect pregnancy or breast feeding.
Terbinafine Counseling: Patient counseling regarding adverse effects of terbinafine including but not limited to headache, diarrhea, rash, upset stomach, liver function test abnormalities, itching, taste/smell disturbance, nausea, abdominal pain, and flatulence.  There is a rare possibility of liver failure that can occur when taking terbinafine.  The patient understands that a baseline LFT and kidney function test may be required. The patient verbalized understanding of the proper use and possible adverse effects of terbinafine.  All of the patient's questions and concerns were addressed.
Azathioprine Counseling:  I discussed with the patient the risks of azathioprine including but not limited to myelosuppression, immunosuppression, hepatotoxicity, lymphoma, and infections.  The patient understands that monitoring is required including baseline LFTs, Creatinine, possible TPMP genotyping and weekly CBCs for the first month and then every 2 weeks thereafter.  The patient verbalized understanding of the proper use and possible adverse effects of azathioprine.  All of the patient's questions and concerns were addressed.
Quinolones Counseling:  I discussed with the patient the risks of fluoroquinolones including but not limited to GI upset, allergic reaction, drug rash, diarrhea, dizziness, photosensitivity, yeast infections, liver function test abnormalities, tendonitis/tendon rupture.
Zyclara Counseling:  I discussed with the patient the risks of imiquimod including but not limited to erythema, scaling, itching, weeping, crusting, and pain.  Patient understands that the inflammatory response to imiquimod is variable from person to person and was educated regarded proper titration schedule.  If flu-like symptoms develop, patient knows to discontinue the medication and contact us.
Rhofade Counseling: Rhofade is a topical medication which can decrease superficial blood flow where applied. Side effects are uncommon and include stinging, redness and allergic reactions.
Rituxan Pregnancy And Lactation Text: This medication is Pregnancy Category C and it isn't know if it is safe during pregnancy. It is unknown if this medication is excreted in breast milk but similar antibodies are known to be excreted.
Otezla Pregnancy And Lactation Text: This medication is Pregnancy Category C and it isn't known if it is safe during pregnancy. It is unknown if it is excreted in breast milk.
Qbrexza Pregnancy And Lactation Text: There is no available data on Qbrexza use in pregnant women.  There is no available data on Qbrexza use in lactation.
Terbinafine Pregnancy And Lactation Text: This medication is Pregnancy Category B and is considered safe during pregnancy. It is also excreted in breast milk and breast feeding isn't recommended.
Eucrisa Pregnancy And Lactation Text: This medication has not been assigned a Pregnancy Risk Category but animal studies failed to show danger with the topical medication. It is unknown if the medication is excreted in breast milk.
Otezla Counseling: The side effects of Otezla were discussed with the patient, including but not limited to worsening or new depression, weight loss, diarrhea, nausea, upper respiratory tract infection, and headache. Patient instructed to call the office should any adverse effect occur.  The patient verbalized understanding of the proper use and possible adverse effects of Otezla.  All the patient's questions and concerns were addressed.
Rituxan Counseling:  I discussed with the patient the risks of Rituxan infusions. Side effects can include infusion reactions, severe drug rashes including mucocutaneous reactions, reactivation of latent hepatitis and other infections and rarely progressive multifocal leukoencephalopathy.  All of the patient's questions and concerns were addressed.
Finasteride Male Counseling: Finasteride Counseling:  I discussed with the patient the risks of use of finasteride including but not limited to decreased libido, decreased ejaculate volume, gynecomastia, and depression. Women should not handle medication.  All of the patient's questions and concerns were addressed.
Oxybutynin Counseling:  I discussed with the patient the risks of oxybutynin including but not limited to skin rash, drowsiness, dry mouth, difficulty urinating, and blurred vision.
Siliq Counseling:  I discussed with the patient the risks of Siliq including but not limited to new or worsening depression, suicidal thoughts and behavior, immunosuppression, malignancy, posterior leukoencephalopathy syndrome, and serious infections.  The patient understands that monitoring is required including a PPD at baseline and must alert us or the primary physician if symptoms of infection or other concerning signs are noted. There is also a special program designed to monitor depression which is required with Siliq.
Cimzia Counseling:  I discussed with the patient the risks of Cimzia including but not limited to immunosuppression, allergic reactions and infections.  The patient understands that monitoring is required including a PPD at baseline and must alert us or the primary physician if symptoms of infection or other concerning signs are noted.
Rhofade Pregnancy And Lactation Text: This medication has not been assigned a Pregnancy Risk Category. It is unknown if the medication is excreted in breast milk.
Gabapentin Counseling: I discussed with the patient the risks of gabapentin including but not limited to dizziness, somnolence, fatigue and ataxia.
Cibinqo Pregnancy And Lactation Text: It is unknown if this medication will adversely affect pregnancy or breast feeding.  You should not take this medication if you are currently pregnant or planning a pregnancy or while breastfeeding.
Finasteride Pregnancy And Lactation Text: This medication is absolutely contraindicated during pregnancy. It is unknown if it is excreted in breast milk.
Azelaic Acid Counseling: Patient counseled that medicine may cause skin irritation and to avoid applying near the eyes.  In the event of skin irritation, the patient was advised to reduce the amount of the drug applied or use it less frequently.   The patient verbalized understanding of the proper use and possible adverse effects of azelaic acid.  All of the patient's questions and concerns were addressed.
Hydroquinone Counseling:  Patient advised that medication may result in skin irritation, lightening (hypopigmentation), dryness, and burning.  In the event of skin irritation, the patient was advised to reduce the amount of the drug applied or use it less frequently.  Rarely, spots that are treated with hydroquinone can become darker (pseudoochronosis).  Should this occur, patient instructed to stop medication and call the office. The patient verbalized understanding of the proper use and possible adverse effects of hydroquinone.  All of the patient's questions and concerns were addressed.
Azathioprine Pregnancy And Lactation Text: This medication is Pregnancy Category D and isn't considered safe during pregnancy. It is unknown if this medication is excreted in breast milk.
Solaraze Counseling:  I discussed with the patient the risks of Solaraze including but not limited to erythema, scaling, itching, weeping, crusting, and pain.
Azithromycin Counseling:  I discussed with the patient the risks of azithromycin including but not limited to GI upset, allergic reaction, drug rash, diarrhea, and yeast infections.
Rifampin Counseling: I discussed with the patient the risks of rifampin including but not limited to liver damage, kidney damage, red-orange body fluids, nausea/vomiting and severe allergy.
Gabapentin Pregnancy And Lactation Text: This medication is Pregnancy Category C and isn't considered safe during pregnancy. It is excreted in breast milk.
Cimzia Pregnancy And Lactation Text: This medication crosses the placenta but can be considered safe in certain situations. Cimzia may be excreted in breast milk.
Imiquimod Counseling:  I discussed with the patient the risks of imiquimod including but not limited to erythema, scaling, itching, weeping, crusting, and pain.  Patient understands that the inflammatory response to imiquimod is variable from person to person and was educated regarded proper titration schedule.  If flu-like symptoms develop, patient knows to discontinue the medication and contact us.
Cellcept Counseling:  I discussed with the patient the risks of mycophenolate mofetil including but not limited to infection/immunosuppression, GI upset, hypokalemia, hypercholesterolemia, bone marrow suppression, lymphoproliferative disorders, malignancy, GI ulceration/bleed/perforation, colitis, interstitial lung disease, kidney failure, progressive multifocal leukoencephalopathy, and birth defects.  The patient understands that monitoring is required including a baseline creatinine and regular CBC testing. In addition, patient must alert us immediately if symptoms of infection or other concerning signs are noted.
Siliq Pregnancy And Lactation Text: The risk during pregnancy and breastfeeding is uncertain with this medication.
Oxybutynin Pregnancy And Lactation Text: This medication is Pregnancy Category B and is considered safe during pregnancy. It is unknown if it is excreted in breast milk.
Cimetidine Counseling:  I discussed with the patient the risks of Cimetidine including but not limited to gynecomastia, headache, diarrhea, nausea, drowsiness, arrhythmias, pancreatitis, skin rashes, psychosis, bone marrow suppression and kidney toxicity.
Glycopyrrolate Counseling:  I discussed with the patient the risks of glycopyrrolate including but not limited to skin rash, drowsiness, dry mouth, difficulty urinating, and blurred vision.
Cosentyx Counseling:  I discussed with the patient the risks of Cosentyx including but not limited to worsening of Crohn's disease, immunosuppression, allergic reactions and infections.  The patient understands that monitoring is required including a PPD at baseline and must alert us or the primary physician if symptoms of infection or other concerning signs are noted.
Doxepin Counseling:  Patient advised that the medication is sedating and not to drive a car after taking this medication. Patient informed of potential adverse effects including but not limited to dry mouth, urinary retention, and blurry vision.  The patient verbalized understanding of the proper use and possible adverse effects of doxepin.  All of the patient's questions and concerns were addressed.
Cyclophosphamide Counseling:  I discussed with the patient the risks of cyclophosphamide including but not limited to hair loss, hormonal abnormalities, decreased fertility, abdominal pain, diarrhea, nausea and vomiting, bone marrow suppression and infection. The patient understands that monitoring is required while taking this medication.
Opioid Pregnancy And Lactation Text: These medications can lead to premature delivery and should be avoided during pregnancy. These medications are also present in breast milk in small amounts.
Opioid Counseling: I discussed with the patient the potential side effects of opioids including but not limited to addiction, altered mental status, and depression. I stressed avoiding alcohol, benzodiazepines, muscle relaxants and sleep aids unless specifically okayed by a physician. The patient verbalized understanding of the proper use and possible adverse effects of opioids. All of the patient's questions and concerns were addressed. They were instructed to flush the remaining pills down the toilet if they did not need them for pain.
Benzoyl Peroxide Counseling: Patient counseled that medicine may cause skin irritation and bleach clothing.  In the event of skin irritation, the patient was advised to reduce the amount of the drug applied or use it less frequently.   The patient verbalized understanding of the proper use and possible adverse effects of benzoyl peroxide.  All of the patient's questions and concerns were addressed.
Rifampin Pregnancy And Lactation Text: This medication is Pregnancy Category C and it isn't know if it is safe during pregnancy. It is also excreted in breast milk and should not be used if you are breast feeding.
Simponi Counseling:  I discussed with the patient the risks of golimumab including but not limited to myelosuppression, immunosuppression, autoimmune hepatitis, demyelinating diseases, lymphoma, and serious infections.  The patient understands that monitoring is required including a PPD at baseline and must alert us or the primary physician if symptoms of infection or other concerning signs are noted.
Propranolol Counseling:  I discussed with the patient the risks of propranolol including but not limited to low heart rate, low blood pressure, low blood sugar, restlessness and increased cold sensitivity. They should call the office if they experience any of these side effects.
Azithromycin Pregnancy And Lactation Text: This medication is considered safe during pregnancy and is also secreted in breast milk.
Solaraze Pregnancy And Lactation Text: This medication is Pregnancy Category B and is considered safe. There is some data to suggest avoiding during the third trimester. It is unknown if this medication is excreted in breast milk.
Klisyri Counseling:  I discussed with the patient the risks of Klisyri including but not limited to erythema, scaling, itching, weeping, crusting, and pain.
Cyclophosphamide Pregnancy And Lactation Text: This medication is Pregnancy Category D and it isn't considered safe during pregnancy. This medication is excreted in breast milk.
Bactrim Counseling:  I discussed with the patient the risks of sulfa antibiotics including but not limited to GI upset, allergic reaction, drug rash, diarrhea, dizziness, photosensitivity, and yeast infections.  Rarely, more serious reactions can occur including but not limited to aplastic anemia, agranulocytosis, methemoglobinemia, blood dyscrasias, liver or kidney failure, lung infiltrates or desquamative/blistering drug rashes.
Skyrizi Counseling: I discussed with the patient the risks of risankizumab-rzaa including but not limited to immunosuppression, and serious infections.  The patient understands that monitoring is required including a PPD at baseline and must alert us or the primary physician if symptoms of infection or other concerning signs are noted.
Birth Control Pills Counseling: Birth Control Pill Counseling: I discussed with the patient the potential side effects of OCPs including but not limited to increased risk of stroke, heart attack, thrombophlebitis, deep venous thrombosis, hepatic adenomas, breast changes, GI upset, headaches, and depression.  The patient verbalized understanding of the proper use and possible adverse effects of OCPs. All of the patient's questions and concerns were addressed.
Doxepin Pregnancy And Lactation Text: This medication is Pregnancy Category C and it isn't known if it is safe during pregnancy. It is also excreted in breast milk and breast feeding isn't recommended.
Carac Counseling:  I discussed with the patient the risks of Carac including but not limited to erythema, scaling, itching, weeping, crusting, and pain.
Propranolol Pregnancy And Lactation Text: This medication is Pregnancy Category C and it isn't known if it is safe during pregnancy. It is excreted in breast milk.
Benzoyl Peroxide Pregnancy And Lactation Text: This medication is Pregnancy Category C. It is unknown if benzoyl peroxide is excreted in breast milk.
Glycopyrrolate Pregnancy And Lactation Text: This medication is Pregnancy Category B and is considered safe during pregnancy. It is unknown if it is excreted breast milk.
Topical Retinoid counseling:  Patient advised to apply a pea-sized amount only at bedtime and wait 30 minutes after washing their face before applying.  If too drying, patient may add a non-comedogenic moisturizer. The patient verbalized understanding of the proper use and possible adverse effects of retinoids.  All of the patient's questions and concerns were addressed.
Sarecycline Counseling: Patient advised regarding possible photosensitivity and discoloration of the teeth, skin, lips, tongue and gums.  Patient instructed to avoid sunlight, if possible.  When exposed to sunlight, patients should wear protective clothing, sunglasses, and sunscreen.  The patient was instructed to call the office immediately if the following severe adverse effects occur:  hearing changes, easy bruising/bleeding, severe headache, or vision changes.  The patient verbalized understanding of the proper use and possible adverse effects of sarecycline.  All of the patient's questions and concerns were addressed.
Arava Counseling:  Patient counseled regarding adverse effects of Arava including but not limited to nausea, vomiting, abnormalities in liver function tests. Patients may develop mouth sores, rash, diarrhea, and abnormalities in blood counts. The patient understands that monitoring is required including LFTs and blood counts.  There is a rare possibility of scarring of the liver and lung problems that can occur when taking methotrexate. Persistent nausea, loss of appetite, pale stools, dark urine, cough, and shortness of breath should be reported immediately. Patient advised to discontinue Arava treatment and consult with a physician prior to attempting conception. The patient will have to undergo a treatment to eliminate Arava from the body prior to conception.
Hydroxyzine Counseling: Patient advised that the medication is sedating and not to drive a car after taking this medication.  Patient informed of potential adverse effects including but not limited to dry mouth, urinary retention, and blurry vision.  The patient verbalized understanding of the proper use and possible adverse effects of hydroxyzine.  All of the patient's questions and concerns were addressed.
Tetracycline Counseling: Patient counseled regarding possible photosensitivity and increased risk for sunburn.  Patient instructed to avoid sunlight, if possible.  When exposed to sunlight, patients should wear protective clothing, sunglasses, and sunscreen.  The patient was instructed to call the office immediately if the following severe adverse effects occur:  hearing changes, easy bruising/bleeding, severe headache, or vision changes.  The patient verbalized understanding of the proper use and possible adverse effects of tetracycline.  All of the patient's questions and concerns were addressed. Patient understands to avoid pregnancy while on therapy due to potential birth defects.
Tazorac Counseling:  Patient advised that medication is irritating and drying.  Patient may need to apply sparingly and wash off after an hour before eventually leaving it on overnight.  The patient verbalized understanding of the proper use and possible adverse effects of tazorac.  All of the patient's questions and concerns were addressed.
Cyclosporine Counseling:  I discussed with the patient the risks of cyclosporine including but not limited to hypertension, gingival hyperplasia,myelosuppression, immunosuppression, liver damage, kidney damage, neurotoxicity, lymphoma, and serious infections. The patient understands that monitoring is required including baseline blood pressure, CBC, CMP, lipid panel and uric acid, and then 1-2 times monthly CMP and blood pressure.
Bactrim Pregnancy And Lactation Text: This medication is Pregnancy Category D and is known to cause fetal risk.  It is also excreted in breast milk.
Hydroxychloroquine Counseling:  I discussed with the patient that a baseline ophthalmologic exam is needed at the start of therapy and every year thereafter while on therapy. A CBC may also be warranted for monitoring.  The side effects of this medication were discussed with the patient, including but not limited to agranulocytosis, aplastic anemia, seizures, rashes, retinopathy, and liver toxicity. Patient instructed to call the office should any adverse effect occur.  The patient verbalized understanding of the proper use and possible adverse effects of Plaquenil.  All the patient's questions and concerns were addressed.
Dupixent Counseling: I discussed with the patient the risks of dupilumab including but not limited to eye infection and irritation, cold sores, injection site reactions, worsening of asthma, allergic reactions and increased risk of parasitic infection.  Live vaccines should be avoided while taking dupilumab. Dupilumab will also interact with certain medications such as warfarin and cyclosporine. The patient understands that monitoring is required and they must alert us or the primary physician if symptoms of infection or other concerning signs are noted.
Klisyri Pregnancy And Lactation Text: It is unknown if this medication can harm a developing fetus or if it is excreted in breast milk.
Cyclosporine Pregnancy And Lactation Text: This medication is Pregnancy Category C and it isn't know if it is safe during pregnancy. This medication is excreted in breast milk.
Stelara Counseling:  I discussed with the patient the risks of ustekinumab including but not limited to immunosuppression, malignancy, posterior leukoencephalopathy syndrome, and serious infections.  The patient understands that monitoring is required including a PPD at baseline and must alert us or the primary physician if symptoms of infection or other concerning signs are noted.
Calcipotriene Counseling:  I discussed with the patient the risks of calcipotriene including but not limited to erythema, scaling, itching, and irritation.
Hydroxychloroquine Pregnancy And Lactation Text: This medication has been shown to cause fetal harm but it isn't assigned a Pregnancy Risk Category. There are small amounts excreted in breast milk.
Tazorac Pregnancy And Lactation Text: This medication is not safe during pregnancy. It is unknown if this medication is excreted in breast milk.
Enbrel Counseling:  I discussed with the patient the risks of etanercept including but not limited to myelosuppression, immunosuppression, autoimmune hepatitis, demyelinating diseases, lymphoma, and infections.  The patient understands that monitoring is required including a PPD at baseline and must alert us or the primary physician if symptoms of infection or other concerning signs are noted.
Cephalexin Counseling: I counseled the patient regarding use of cephalexin as an antibiotic for prophylactic and/or therapeutic purposes. Cephalexin (commonly prescribed under brand name Keflex) is a cephalosporin antibiotic which is active against numerous classes of bacteria, including most skin bacteria. Side effects may include nausea, diarrhea, gastrointestinal upset, rash, hives, yeast infections, and in rare cases, hepatitis, kidney disease, seizures, fever, confusion, neurologic symptoms, and others. Patients with severe allergies to penicillin medications are cautioned that there is about a 10% incidence of cross-reactivity with cephalosporins. When possible, patients with penicillin allergies should use alternatives to cephalosporins for antibiotic therapy.
Birth Control Pills Pregnancy And Lactation Text: This medication should be avoided if pregnant and for the first 30 days post-partum.
Dupixent Pregnancy And Lactation Text: This medication likely crosses the placenta but the risk for the fetus is uncertain. This medication is excreted in breast milk.
Minoxidil Counseling: Minoxidil is a topical medication which can increase blood flow where it is applied. It is uncertain how this medication increases hair growth. Side effects are uncommon and include stinging and allergic reactions.
Hydroxyzine Pregnancy And Lactation Text: This medication is not safe during pregnancy and should not be taken. It is also excreted in breast milk and breast feeding isn't recommended.
Acitretin Counseling:  I discussed with the patient the risks of acitretin including but not limited to hair loss, dry lips/skin/eyes, liver damage, hyperlipidemia, depression/suicidal ideation, photosensitivity.  Serious rare side effects can include but are not limited to pancreatitis, pseudotumor cerebri, bony changes, clot formation/stroke/heart attack.  Patient understands that alcohol is contraindicated since it can result in liver toxicity and significantly prolong the elimination of the drug by many years.
Methotrexate Counseling:  Patient counseled regarding adverse effects of methotrexate including but not limited to nausea, vomiting, abnormalities in liver function tests. Patients may develop mouth sores, rash, diarrhea, and abnormalities in blood counts. The patient understands that monitoring is required including LFT's and blood counts.  There is a rare possibility of scarring of the liver and lung problems that can occur when taking methotrexate. Persistent nausea, loss of appetite, pale stools, dark urine, cough, and shortness of breath should be reported immediately. Patient advised to discontinue methotrexate treatment at least three months before attempting to become pregnant.  I discussed the need for folate supplements while taking methotrexate.  These supplements can decrease side effects during methotrexate treatment. The patient verbalized understanding of the proper use and possible adverse effects of methotrexate.  All of the patient's questions and concerns were addressed.
Clofazimine Counseling:  I discussed with the patient the risks of clofazimine including but not limited to skin and eye pigmentation, liver damage, nausea/vomiting, gastrointestinal bleeding and allergy.
Clindamycin Counseling: I counseled the patient regarding use of clindamycin as an antibiotic for prophylactic and/or therapeutic purposes. Clindamycin is active against numerous classes of bacteria, including skin bacteria. Side effects may include nausea, diarrhea, gastrointestinal upset, rash, hives, yeast infections, and in rare cases, colitis.
Topical Clindamycin Counseling: Patient counseled that this medication may cause skin irritation or allergic reactions.  In the event of skin irritation, the patient was advised to reduce the amount of the drug applied or use it less frequently.   The patient verbalized understanding of the proper use and possible adverse effects of clindamycin.  All of the patient's questions and concerns were addressed.
Mirvaso Counseling: Mirvaso is a topical medication which can decrease superficial blood flow where applied. Side effects are uncommon and include stinging, redness and allergic reactions.
Spironolactone Pregnancy And Lactation Text: This medication can cause feminization of the male fetus and should be avoided during pregnancy. The active metabolite is also found in breast milk.
Cephalexin Pregnancy And Lactation Text: This medication is Pregnancy Category B and considered safe during pregnancy.  It is also excreted in breast milk but can be used safely for shorter doses.
Spironolactone Counseling: Patient advised regarding risks of diarrhea, abdominal pain, hyperkalemia, birth defects (for female patients), liver toxicity and renal toxicity. The patient may need blood work to monitor liver and kidney function and potassium levels while on therapy. The patient verbalized understanding of the proper use and possible adverse effects of spironolactone.  All of the patient's questions and concerns were addressed.
Calcipotriene Pregnancy And Lactation Text: This medication has not been proven safe during pregnancy. It is unknown if this medication is excreted in breast milk.
Libtayo Counseling- I discussed with the patient the risks of Libtayo including but not limited to nausea, vomiting, diarrhea, and bone or muscle pain.  The patient verbalized understanding of the proper use and possible adverse effects of Libtayo.  All of the patient's questions and concerns were addressed.
SSKI Counseling:  I discussed with the patient the risks of SSKI including but not limited to thyroid abnormalities, metallic taste, GI upset, fever, headache, acne, arthralgias, paraesthesias, lymphadenopathy, easy bleeding, arrhythmias, and allergic reaction.
Acitretin Pregnancy And Lactation Text: This medication is Pregnancy Category X and should not be given to women who are pregnant or may become pregnant in the future. This medication is excreted in breast milk.
Taltz Counseling: I discussed with the patient the risks of ixekizumab including but not limited to immunosuppression, serious infections, worsening of inflammatory bowel disease and drug reactions.  The patient understands that monitoring is required including a PPD at baseline and must alert us or the primary physician if symptoms of infection or other concerning signs are noted.
Clindamycin Pregnancy And Lactation Text: This medication can be used in pregnancy if certain situations. Clindamycin is also present in breast milk.
Humira Counseling:  I discussed with the patient the risks of adalimumab including but not limited to myelosuppression, immunosuppression, autoimmune hepatitis, demyelinating diseases, lymphoma, and serious infections.  The patient understands that monitoring is required including a PPD at baseline and must alert us or the primary physician if symptoms of infection or other concerning signs are noted.
Niacinamide Counseling: I recommended taking niacin or niacinamide, also know as vitamin B3, twice daily. Recent evidence suggests that taking vitamin B3 (500 mg twice daily) can reduce the risk of actinic keratoses and non-melanoma skin cancers. Side effects of vitamin B3 include flushing and headache.
Libtayo Pregnancy And Lactation Text: This medication is contraindicated in pregnancy and when breast feeding.
Albendazole Counseling:  I discussed with the patient the risks of albendazole including but not limited to cytopenia, kidney damage, nausea/vomiting and severe allergy.  The patient understands that this medication is being used in an off-label manner.
Fluconazole Counseling:  Patient counseled regarding adverse effects of fluconazole including but not limited to headache, diarrhea, nausea, upset stomach, liver function test abnormalities, taste disturbance, and stomach pain.  There is a rare possibility of liver failure that can occur when taking fluconazole.  The patient understands that monitoring of LFTs and kidney function test may be required, especially at baseline. The patient verbalized understanding of the proper use and possible adverse effects of fluconazole.  All of the patient's questions and concerns were addressed.
Methotrexate Pregnancy And Lactation Text: This medication is Pregnancy Category X and is known to cause fetal harm. This medication is excreted in breast milk.
Topical Ketoconazole Counseling: Patient counseled that this medication may cause skin irritation or allergic reactions.  In the event of skin irritation, the patient was advised to reduce the amount of the drug applied or use it less frequently.   The patient verbalized understanding of the proper use and possible adverse effects of ketoconazole.  All of the patient's questions and concerns were addressed.
Doxycycline Counseling:  Patient counseled regarding possible photosensitivity and increased risk for sunburn.  Patient instructed to avoid sunlight, if possible.  When exposed to sunlight, patients should wear protective clothing, sunglasses, and sunscreen.  The patient was instructed to call the office immediately if the following severe adverse effects occur:  hearing changes, easy bruising/bleeding, severe headache, or vision changes.  The patient verbalized understanding of the proper use and possible adverse effects of doxycycline.  All of the patient's questions and concerns were addressed.
Sski Pregnancy And Lactation Text: This medication is Pregnancy Category D and isn't considered safe during pregnancy. It is excreted in breast milk.
Niacinamide Pregnancy And Lactation Text: These medications are considered safe during pregnancy.
Opzelura Counseling:  I discussed with the patient the risks of Opzelura including but not limited to nasopharngitis, bronchitis, ear infection, eosinophila, hives, diarrhea, folliculitis, tonsillitis, and rhinorrhea.  Taken orally, this medication has been linked to serious infections; higher rate of mortality; malignancy and lymphoproliferative disorders; major adverse cardiovascular events; thrombosis; thrombocytopenia, anemia, and neutropenia; and lipid elevations.
Prednisone Counseling:  I discussed with the patient the risks of prolonged use of prednisone including but not limited to weight gain, insomnia, osteoporosis, mood changes, diabetes, susceptibility to infection, glaucoma and high blood pressure.  In cases where prednisone use is prolonged, patients should be monitored with blood pressure checks, serum glucose levels and an eye exam.  Additionally, the patient may need to be placed on GI prophylaxis, PCP prophylaxis, and calcium and vitamin D supplementation and/or a bisphosphonate.  The patient verbalized understanding of the proper use and the possible adverse effects of prednisone.  All of the patient's questions and concerns were addressed.
Bexarotene Counseling:  I discussed with the patient the risks of bexarotene including but not limited to hair loss, dry lips/skin/eyes, liver abnormalities, hyperlipidemia, pancreatitis, depression/suicidal ideation, photosensitivity, drug rash/allergic reactions, hypothyroidism, anemia, leukopenia, infection, cataracts, and teratogenicity.  Patient understands that they will need regular blood tests to check lipid profile, liver function tests, white blood cell count, thyroid function tests and pregnancy test if applicable.
Colchicine Counseling:  Patient counseled regarding adverse effects including but not limited to stomach upset (nausea, vomiting, stomach pain, or diarrhea).  Patient instructed to limit alcohol consumption while taking this medication.  Colchicine may reduce blood counts especially with prolonged use.  The patient understands that monitoring of kidney function and blood counts may be required, especially at baseline. The patient verbalized understanding of the proper use and possible adverse effects of colchicine.  All of the patient's questions and concerns were addressed.
Albendazole Pregnancy And Lactation Text: This medication is Pregnancy Category C and it isn't known if it is safe during pregnancy. It is also excreted in breast milk.
Cantharidin Pregnancy And Lactation Text: The use of this medication during pregnancy or lactation is not recommended as there is insufficient data.
Nsaids Counseling: NSAID Counseling: I discussed with the patient that NSAIDs should be taken with food. Prolonged use of NSAIDs can result in the development of stomach ulcers.  Patient advised to stop taking NSAIDs if abdominal pain occurs.  The patient verbalized understanding of the proper use and possible adverse effects of NSAIDs.  All of the patient's questions and concerns were addressed.
Ilumya Counseling: I discussed with the patient the risks of tildrakizumab including but not limited to immunosuppression, malignancy, posterior leukoencephalopathy syndrome, and serious infections.  The patient understands that monitoring is required including a PPD at baseline and must alert us or the primary physician if symptoms of infection or other concerning signs are noted.
Bexarotene Pregnancy And Lactation Text: This medication is Pregnancy Category X and should not be given to women who are pregnant or may become pregnant. This medication should not be used if you are breast feeding.
Opzelura Pregnancy And Lactation Text: There is insufficient data to evaluate drug-associated risk for major birth defects, miscarriage, or other adverse maternal or fetal outcomes.  There is a pregnancy registry that monitors pregnancy outcomes in pregnant persons exposed to the medication during pregnancy.  It is unknown if this medication is excreted in breast milk.  Do not breastfeed during treatment and for about 4 weeks after the last dose.
Dapsone Counseling: I discussed with the patient the risks of dapsone including but not limited to hemolytic anemia, agranulocytosis, rashes, methemoglobinemia, kidney failure, peripheral neuropathy, headaches, GI upset, and liver toxicity.  Patients who start dapsone require monitoring including baseline LFTs and weekly CBCs for the first month, then every month thereafter.  The patient verbalized understanding of the proper use and possible adverse effects of dapsone.  All of the patient's questions and concerns were addressed.
Ivermectin Counseling:  Patient instructed to take medication on an empty stomach with a full glass of water.  Patient informed of potential adverse effects including but not limited to nausea, diarrhea, dizziness, itching, and swelling of the extremities or lymph nodes.  The patient verbalized understanding of the proper use and possible adverse effects of ivermectin.  All of the patient's questions and concerns were addressed.
5-Fu Counseling: 5-Fluorouracil Counseling:  I discussed with the patient the risks of 5-fluorouracil including but not limited to erythema, scaling, itching, weeping, crusting, and pain.
Griseofulvin Counseling:  I discussed with the patient the risks of griseofulvin including but not limited to photosensitivity, cytopenia, liver damage, nausea/vomiting and severe allergy.  The patient understands that this medication is best absorbed when taken with a fatty meal (e.g., ice cream or french fries).
Tremfya Counseling: I discussed with the patient the risks of guselkumab including but not limited to immunosuppression, serious infections, and drug reactions.  The patient understands that monitoring is required including a PPD at baseline and must alert us or the primary physician if symptoms of infection or other concerning signs are noted.
Doxycycline Pregnancy And Lactation Text: This medication is Pregnancy Category D and not consider safe during pregnancy. It is also excreted in breast milk but is considered safe for shorter treatment courses.
Thalidomide Counseling: I discussed with the patient the risks of thalidomide including but not limited to birth defects, anxiety, weakness, chest pain, dizziness, cough and severe allergy.
Picato Counseling:  I discussed with the patient the risks of Picato including but not limited to erythema, scaling, itching, weeping, crusting, and pain.
Erythromycin Counseling:  I discussed with the patient the risks of erythromycin including but not limited to GI upset, allergic reaction, drug rash, diarrhea, increase in liver enzymes, and yeast infections.
Xeljanz Counseling: I discussed with the patient the risks of Xeljanz therapy including increased risk of infection, liver issues, headache, diarrhea, or cold symptoms. Live vaccines should be avoided. They were instructed to call if they have any problems.
Dapsone Pregnancy And Lactation Text: This medication is Pregnancy Category C and is not considered safe during pregnancy or breast feeding.
Tranexamic Acid Counseling:  Patient advised of the small risk of bleeding problems with tranexamic acid. They were also instructed to call if they developed any nausea, vomiting or diarrhea. All of the patient's questions and concerns were addressed.
Drysol Counseling:  I discussed with the patient the risks of drysol/aluminum chloride including but not limited to skin rash, itching, irritation, burning.
Nsaids Pregnancy And Lactation Text: These medications are considered safe up to 30 weeks gestation. It is excreted in breast milk.
Topical Sulfur Applications Counseling: Topical Sulfur Counseling: Patient counseled that this medication may cause skin irritation or allergic reactions.  In the event of skin irritation, the patient was advised to reduce the amount of the drug applied or use it less frequently.   The patient verbalized understanding of the proper use and possible adverse effects of topical sulfur application.  All of the patient's questions and concerns were addressed.
Isotretinoin Counseling: Patient should get monthly blood tests, not donate blood, not drive at night if vision affected, not share medication, and not undergo elective surgery for 6 months after tx completed. Side effects reviewed, pt to contact office should one occur.
Griseofulvin Pregnancy And Lactation Text: This medication is Pregnancy Category X and is known to cause serious birth defects. It is unknown if this medication is excreted in breast milk but breast feeding should be avoided.

## 2022-06-17 ENCOUNTER — APPOINTMENT (RX ONLY)
Dept: URBAN - METROPOLITAN AREA CLINIC 41 | Facility: CLINIC | Age: 42
Setting detail: DERMATOLOGY
End: 2022-06-17

## 2022-06-17 DIAGNOSIS — L28.0 LICHEN SIMPLEX CHRONICUS: ICD-10-CM

## 2022-06-17 PROCEDURE — ? MEDICATION COUNSELING

## 2022-06-17 PROCEDURE — ? COUNSELING

## 2022-06-17 PROCEDURE — 99213 OFFICE O/P EST LOW 20 MIN: CPT

## 2022-06-17 PROCEDURE — ? TREATMENT REGIMEN

## 2022-06-17 ASSESSMENT — LOCATION DETAILED DESCRIPTION DERM: LOCATION DETAILED: RIGHT LABIA MAJORA

## 2022-06-17 ASSESSMENT — LOCATION SIMPLE DESCRIPTION DERM: LOCATION SIMPLE: VULVA

## 2022-06-17 ASSESSMENT — LOCATION ZONE DERM: LOCATION ZONE: VULVA

## 2022-06-17 NOTE — PROCEDURE: MEDICATION COUNSELING
Dutasteride Male Counseling: Dustasteride Counseling:  I discussed with the patient the risks of use of dutasteride including but not limited to decreased libido, decreased ejaculate volume, and gynecomastia. Women who can become pregnant should not handle medication.  All of the patient's questions and concerns were addressed.
Drysol Pregnancy And Lactation Text: This medication is considered safe during pregnancy and breast feeding.
Itraconazole Pregnancy And Lactation Text: This medication is Pregnancy Category C and it isn't know if it is safe during pregnancy. It is also excreted in breast milk.
Xelfrankiez Pregnancy And Lactation Text: This medication is Pregnancy Category D and is not considered safe during pregnancy.  The risk during breast feeding is also uncertain.
Wartpeel Counseling:  I discussed with the patient the risks of Wartpeel including but not limited to erythema, scaling, itching, weeping, crusting, and pain.
Tranexamic Acid Pregnancy And Lactation Text: It is unknown if this medication is safe during pregnancy or breast feeding.
Topical Sulfur Applications Pregnancy And Lactation Text: This medication is Pregnancy Category C and has an unknown safety profile during pregnancy. It is unknown if this topical medication is excreted in breast milk.
Isotretinoin Pregnancy And Lactation Text: This medication is Pregnancy Category X and is considered extremely dangerous during pregnancy. It is unknown if it is excreted in breast milk.
Itraconazole Counseling:  I discussed with the patient the risks of itraconazole including but not limited to liver damage, nausea/vomiting, neuropathy, and severe allergy.  The patient understands that this medication is best absorbed when taken with acidic beverages such as non-diet cola or ginger ale.  The patient understands that monitoring is required including baseline LFTs and repeat LFTs at intervals.  The patient understands that they are to contact us or the primary physician if concerning signs are noted.
Erythromycin Pregnancy And Lactation Text: This medication is Pregnancy Category B and is considered safe during pregnancy. It is also excreted in breast milk.
Odomzo Counseling- I discussed with the patient the risks of Odomzo including but not limited to nausea, vomiting, diarrhea, constipation, weight loss, changes in the sense of taste, decreased appetite, muscle spasms, and hair loss.  The patient verbalized understanding of the proper use and possible adverse effects of Odomzo.  All of the patient's questions and concerns were addressed.
Infliximab Counseling:  I discussed with the patient the risks of infliximab including but not limited to myelosuppression, immunosuppression, autoimmune hepatitis, demyelinating diseases, lymphoma, and serious infections.  The patient understands that monitoring is required including a PPD at baseline and must alert us or the primary physician if symptoms of infection or other concerning signs are noted.
Picato Pregnancy And Lactation Text: This medication is Pregnancy Category C. It is unknown if this medication is excreted in breast milk.
Xolair Counseling:  Patient informed of potential adverse effects including but not limited to fever, muscle aches, rash and allergic reactions.  The patient verbalized understanding of the proper use and possible adverse effects of Xolair.  All of the patient's questions and concerns were addressed.
Elidel Counseling: Patient may experience a mild burning sensation during topical application. Elidel is not approved in children less than 2 years of age. There have been case reports of hematologic and skin malignancies in patients using topical calcineurin inhibitors although causality is questionable.
Rinvoq Counseling: I discussed with the patient the risks of Rinvoq therapy including but not limited to upper respiratory tract infections, shingles, cold sores, bronchitis, nausea, cough, fever, acne, and headache. Live vaccines should be avoided.  This medication has been linked to serious infections; higher rate of mortality; malignancy and lymphoproliferative disorders; major adverse cardiovascular events; thrombosis; thrombocytopenia, anemia, and neutropenia; lipid elevations; liver enzyme elevations; and gastrointestinal perforations.
High Dose Vitamin A Pregnancy And Lactation Text: High dose vitamin A therapy is contraindicated during pregnancy and breast feeding.
Wartpeel Pregnancy And Lactation Text: This medication is Pregnancy Category X and contraindicated in pregnancy and in women who may become pregnant. It is unknown if this medication is excreted in breast milk.
Ketoconazole Counseling:   Patient counseled regarding improving absorption with orange juice.  Adverse effects include but are not limited to breast enlargement, headache, diarrhea, nausea, upset stomach, liver function test abnormalities, taste disturbance, and stomach pain.  There is a rare possibility of liver failure that can occur when taking ketoconazole. The patient understands that monitoring of LFTs may be required, especially at baseline. The patient verbalized understanding of the proper use and possible adverse effects of ketoconazole.  All of the patient's questions and concerns were addressed.
Detail Level: Simple
Odomzo Pregnancy And Lactation Text: This medication is Pregnancy Category X and is absolutely contraindicated during pregnancy. It is unknown if it is excreted in breast milk.
Metronidazole Counseling:  I discussed with the patient the risks of metronidazole including but not limited to seizures, nausea/vomiting, a metallic taste in the mouth, nausea/vomiting and severe allergy.
Infliximab Pregnancy And Lactation Text: This medication is Pregnancy Category B and is considered safe during pregnancy. It is unknown if this medication is excreted in breast milk.
High Dose Vitamin A Counseling: Side effects reviewed, pt to contact office should one occur.
Protopic Counseling: Patient may experience a mild burning sensation during topical application. Protopic is not approved in children less than 2 years of age. There have been case reports of hematologic and skin malignancies in patients using topical calcineurin inhibitors although causality is questionable.
Dutasteride Pregnancy And Lactation Text: This medication is absolutely contraindicated in women, especially during pregnancy and breast feeding. Feminization of male fetuses is possible if taking while pregnant.
Winlevi Counseling:  I discussed with the patient the risks of topical clascoterone including but not limited to erythema, scaling, itching, and stinging. Patient voiced their understanding.
Ketoconazole Pregnancy And Lactation Text: This medication is Pregnancy Category C and it isn't know if it is safe during pregnancy. It is also excreted in breast milk and breast feeding isn't recommended.
Minocycline Counseling: Patient advised regarding possible photosensitivity and discoloration of the teeth, skin, lips, tongue and gums.  Patient instructed to avoid sunlight, if possible.  When exposed to sunlight, patients should wear protective clothing, sunglasses, and sunscreen.  The patient was instructed to call the office immediately if the following severe adverse effects occur:  hearing changes, easy bruising/bleeding, severe headache, or vision changes.  The patient verbalized understanding of the proper use and possible adverse effects of minocycline.  All of the patient's questions and concerns were addressed.
Rinvoq Pregnancy And Lactation Text: Based on animal studies, Rinvoq may cause embryo-fetal harm when administered to pregnant women.  The medication should not be used in pregnancy.  Breastfeeding is not recommended during treatment and for 6 days after the last dose.
Valtrex Pregnancy And Lactation Text: this medication is Pregnancy Category B and is considered safe during pregnancy. This medication is not directly found in breast milk but it's metabolite acyclovir is present.
Qbrexza Counseling:  I discussed with the patient the risks of Qbrexza including but not limited to headache, mydriasis, blurred vision, dry eyes, nasal dryness, dry mouth, dry throat, dry skin, urinary hesitation, and constipation.  Local skin reactions including erythema, burning, stinging, and itching can also occur.
Protopic Pregnancy And Lactation Text: This medication is Pregnancy Category C. It is unknown if this medication is excreted in breast milk when applied topically.
Valtrex Counseling: I discussed with the patient the risks of valacyclovir including but not limited to kidney damage, nausea, vomiting and severe allergy.  The patient understands that if the infection seems to be worsening or is not improving, they are to call.
Metronidazole Pregnancy And Lactation Text: This medication is Pregnancy Category B and considered safe during pregnancy.  It is also excreted in breast milk.
Xolair Pregnancy And Lactation Text: This medication is Pregnancy Category B and is considered safe during pregnancy. This medication is excreted in breast milk.
Erivedge Counseling- I discussed with the patient the risks of Erivedge including but not limited to nausea, vomiting, diarrhea, constipation, weight loss, changes in the sense of taste, decreased appetite, muscle spasms, and hair loss.  The patient verbalized understanding of the proper use and possible adverse effects of Erivedge.  All of the patient's questions and concerns were addressed.
Adbry Counseling: I discussed with the patient the risks of tralokinumab including but not limited to eye infection and irritation, cold sores, injection site reactions, worsening of asthma, allergic reactions and increased risk of parasitic infection.  Live vaccines should be avoided while taking tralokinumab. The patient understands that monitoring is required and they must alert us or the primary physician if symptoms of infection or other concerning signs are noted.
Oral Minoxidil Counseling- I discussed with the patient the risks of oral minoxidil including but not limited to shortness of breath, swelling of the feet or ankles, dizziness, lightheadedness, unwanted hair growth and allergic reaction.  The patient verbalized understanding of the proper use and possible adverse effects of oral minoxidil.  All of the patient's questions and concerns were addressed.
Minocycline Pregnancy And Lactation Text: This medication is Pregnancy Category D and not consider safe during pregnancy. It is also excreted in breast milk.
Use Enhanced Medication Counseling?: No
Winlevi Pregnancy And Lactation Text: This medication is considered safe during pregnancy and breastfeeding.
Cibinqo Counseling: I discussed with the patient the risks of Cibinqo therapy including but not limited to common cold, nausea, headache, cold sores, increased blood CPK levels, dizziness, UTIs, fatigue, acne, and vomitting. Live vaccines should be avoided.  This medication has been linked to serious infections; higher rate of mortality; malignancy and lymphoproliferative disorders; major adverse cardiovascular events; thrombosis; thrombocytopenia and lymphopenia; lipid elevations; and retinal detachment.
Oral Minoxidil Pregnancy And Lactation Text: This medication should only be used when clearly needed if you are pregnant, attempting to become pregnant or breast feeding.
Eucrisa Counseling: Patient may experience a mild burning sensation during topical application. Eucrisa is not approved in children less than 2 years of age.
Adbry Pregnancy And Lactation Text: It is unknown if this medication will adversely affect pregnancy or breast feeding.
Terbinafine Counseling: Patient counseling regarding adverse effects of terbinafine including but not limited to headache, diarrhea, rash, upset stomach, liver function test abnormalities, itching, taste/smell disturbance, nausea, abdominal pain, and flatulence.  There is a rare possibility of liver failure that can occur when taking terbinafine.  The patient understands that a baseline LFT and kidney function test may be required. The patient verbalized understanding of the proper use and possible adverse effects of terbinafine.  All of the patient's questions and concerns were addressed.
Azathioprine Counseling:  I discussed with the patient the risks of azathioprine including but not limited to myelosuppression, immunosuppression, hepatotoxicity, lymphoma, and infections.  The patient understands that monitoring is required including baseline LFTs, Creatinine, possible TPMP genotyping and weekly CBCs for the first month and then every 2 weeks thereafter.  The patient verbalized understanding of the proper use and possible adverse effects of azathioprine.  All of the patient's questions and concerns were addressed.
Quinolones Counseling:  I discussed with the patient the risks of fluoroquinolones including but not limited to GI upset, allergic reaction, drug rash, diarrhea, dizziness, photosensitivity, yeast infections, liver function test abnormalities, tendonitis/tendon rupture.
Zyclara Counseling:  I discussed with the patient the risks of imiquimod including but not limited to erythema, scaling, itching, weeping, crusting, and pain.  Patient understands that the inflammatory response to imiquimod is variable from person to person and was educated regarded proper titration schedule.  If flu-like symptoms develop, patient knows to discontinue the medication and contact us.
Rhofade Counseling: Rhofade is a topical medication which can decrease superficial blood flow where applied. Side effects are uncommon and include stinging, redness and allergic reactions.
Rituxan Pregnancy And Lactation Text: This medication is Pregnancy Category C and it isn't know if it is safe during pregnancy. It is unknown if this medication is excreted in breast milk but similar antibodies are known to be excreted.
Otezla Pregnancy And Lactation Text: This medication is Pregnancy Category C and it isn't known if it is safe during pregnancy. It is unknown if it is excreted in breast milk.
Qbrexza Pregnancy And Lactation Text: There is no available data on Qbrexza use in pregnant women.  There is no available data on Qbrexza use in lactation.
Terbinafine Pregnancy And Lactation Text: This medication is Pregnancy Category B and is considered safe during pregnancy. It is also excreted in breast milk and breast feeding isn't recommended.
Eucrisa Pregnancy And Lactation Text: This medication has not been assigned a Pregnancy Risk Category but animal studies failed to show danger with the topical medication. It is unknown if the medication is excreted in breast milk.
Otezla Counseling: The side effects of Otezla were discussed with the patient, including but not limited to worsening or new depression, weight loss, diarrhea, nausea, upper respiratory tract infection, and headache. Patient instructed to call the office should any adverse effect occur.  The patient verbalized understanding of the proper use and possible adverse effects of Otezla.  All the patient's questions and concerns were addressed.
Rituxan Counseling:  I discussed with the patient the risks of Rituxan infusions. Side effects can include infusion reactions, severe drug rashes including mucocutaneous reactions, reactivation of latent hepatitis and other infections and rarely progressive multifocal leukoencephalopathy.  All of the patient's questions and concerns were addressed.
Finasteride Male Counseling: Finasteride Counseling:  I discussed with the patient the risks of use of finasteride including but not limited to decreased libido, decreased ejaculate volume, gynecomastia, and depression. Women should not handle medication.  All of the patient's questions and concerns were addressed.
Oxybutynin Counseling:  I discussed with the patient the risks of oxybutynin including but not limited to skin rash, drowsiness, dry mouth, difficulty urinating, and blurred vision.
Siliq Counseling:  I discussed with the patient the risks of Siliq including but not limited to new or worsening depression, suicidal thoughts and behavior, immunosuppression, malignancy, posterior leukoencephalopathy syndrome, and serious infections.  The patient understands that monitoring is required including a PPD at baseline and must alert us or the primary physician if symptoms of infection or other concerning signs are noted. There is also a special program designed to monitor depression which is required with Siliq.
Cimzia Counseling:  I discussed with the patient the risks of Cimzia including but not limited to immunosuppression, allergic reactions and infections.  The patient understands that monitoring is required including a PPD at baseline and must alert us or the primary physician if symptoms of infection or other concerning signs are noted.
Rhofade Pregnancy And Lactation Text: This medication has not been assigned a Pregnancy Risk Category. It is unknown if the medication is excreted in breast milk.
Gabapentin Counseling: I discussed with the patient the risks of gabapentin including but not limited to dizziness, somnolence, fatigue and ataxia.
Cibinqo Pregnancy And Lactation Text: It is unknown if this medication will adversely affect pregnancy or breast feeding.  You should not take this medication if you are currently pregnant or planning a pregnancy or while breastfeeding.
Finasteride Pregnancy And Lactation Text: This medication is absolutely contraindicated during pregnancy. It is unknown if it is excreted in breast milk.
Azelaic Acid Counseling: Patient counseled that medicine may cause skin irritation and to avoid applying near the eyes.  In the event of skin irritation, the patient was advised to reduce the amount of the drug applied or use it less frequently.   The patient verbalized understanding of the proper use and possible adverse effects of azelaic acid.  All of the patient's questions and concerns were addressed.
Hydroquinone Counseling:  Patient advised that medication may result in skin irritation, lightening (hypopigmentation), dryness, and burning.  In the event of skin irritation, the patient was advised to reduce the amount of the drug applied or use it less frequently.  Rarely, spots that are treated with hydroquinone can become darker (pseudoochronosis).  Should this occur, patient instructed to stop medication and call the office. The patient verbalized understanding of the proper use and possible adverse effects of hydroquinone.  All of the patient's questions and concerns were addressed.
Azathioprine Pregnancy And Lactation Text: This medication is Pregnancy Category D and isn't considered safe during pregnancy. It is unknown if this medication is excreted in breast milk.
Solaraze Counseling:  I discussed with the patient the risks of Solaraze including but not limited to erythema, scaling, itching, weeping, crusting, and pain.
Azithromycin Counseling:  I discussed with the patient the risks of azithromycin including but not limited to GI upset, allergic reaction, drug rash, diarrhea, and yeast infections.
Rifampin Counseling: I discussed with the patient the risks of rifampin including but not limited to liver damage, kidney damage, red-orange body fluids, nausea/vomiting and severe allergy.
Gabapentin Pregnancy And Lactation Text: This medication is Pregnancy Category C and isn't considered safe during pregnancy. It is excreted in breast milk.
Cimzia Pregnancy And Lactation Text: This medication crosses the placenta but can be considered safe in certain situations. Cimzia may be excreted in breast milk.
Imiquimod Counseling:  I discussed with the patient the risks of imiquimod including but not limited to erythema, scaling, itching, weeping, crusting, and pain.  Patient understands that the inflammatory response to imiquimod is variable from person to person and was educated regarded proper titration schedule.  If flu-like symptoms develop, patient knows to discontinue the medication and contact us.
Cellcept Counseling:  I discussed with the patient the risks of mycophenolate mofetil including but not limited to infection/immunosuppression, GI upset, hypokalemia, hypercholesterolemia, bone marrow suppression, lymphoproliferative disorders, malignancy, GI ulceration/bleed/perforation, colitis, interstitial lung disease, kidney failure, progressive multifocal leukoencephalopathy, and birth defects.  The patient understands that monitoring is required including a baseline creatinine and regular CBC testing. In addition, patient must alert us immediately if symptoms of infection or other concerning signs are noted.
Siliq Pregnancy And Lactation Text: The risk during pregnancy and breastfeeding is uncertain with this medication.
Oxybutynin Pregnancy And Lactation Text: This medication is Pregnancy Category B and is considered safe during pregnancy. It is unknown if it is excreted in breast milk.
Cimetidine Counseling:  I discussed with the patient the risks of Cimetidine including but not limited to gynecomastia, headache, diarrhea, nausea, drowsiness, arrhythmias, pancreatitis, skin rashes, psychosis, bone marrow suppression and kidney toxicity.
Glycopyrrolate Counseling:  I discussed with the patient the risks of glycopyrrolate including but not limited to skin rash, drowsiness, dry mouth, difficulty urinating, and blurred vision.
Cosentyx Counseling:  I discussed with the patient the risks of Cosentyx including but not limited to worsening of Crohn's disease, immunosuppression, allergic reactions and infections.  The patient understands that monitoring is required including a PPD at baseline and must alert us or the primary physician if symptoms of infection or other concerning signs are noted.
Doxepin Counseling:  Patient advised that the medication is sedating and not to drive a car after taking this medication. Patient informed of potential adverse effects including but not limited to dry mouth, urinary retention, and blurry vision.  The patient verbalized understanding of the proper use and possible adverse effects of doxepin.  All of the patient's questions and concerns were addressed.
Cyclophosphamide Counseling:  I discussed with the patient the risks of cyclophosphamide including but not limited to hair loss, hormonal abnormalities, decreased fertility, abdominal pain, diarrhea, nausea and vomiting, bone marrow suppression and infection. The patient understands that monitoring is required while taking this medication.
Opioid Pregnancy And Lactation Text: These medications can lead to premature delivery and should be avoided during pregnancy. These medications are also present in breast milk in small amounts.
Opioid Counseling: I discussed with the patient the potential side effects of opioids including but not limited to addiction, altered mental status, and depression. I stressed avoiding alcohol, benzodiazepines, muscle relaxants and sleep aids unless specifically okayed by a physician. The patient verbalized understanding of the proper use and possible adverse effects of opioids. All of the patient's questions and concerns were addressed. They were instructed to flush the remaining pills down the toilet if they did not need them for pain.
Benzoyl Peroxide Counseling: Patient counseled that medicine may cause skin irritation and bleach clothing.  In the event of skin irritation, the patient was advised to reduce the amount of the drug applied or use it less frequently.   The patient verbalized understanding of the proper use and possible adverse effects of benzoyl peroxide.  All of the patient's questions and concerns were addressed.
Rifampin Pregnancy And Lactation Text: This medication is Pregnancy Category C and it isn't know if it is safe during pregnancy. It is also excreted in breast milk and should not be used if you are breast feeding.
Simponi Counseling:  I discussed with the patient the risks of golimumab including but not limited to myelosuppression, immunosuppression, autoimmune hepatitis, demyelinating diseases, lymphoma, and serious infections.  The patient understands that monitoring is required including a PPD at baseline and must alert us or the primary physician if symptoms of infection or other concerning signs are noted.
Propranolol Counseling:  I discussed with the patient the risks of propranolol including but not limited to low heart rate, low blood pressure, low blood sugar, restlessness and increased cold sensitivity. They should call the office if they experience any of these side effects.
Azithromycin Pregnancy And Lactation Text: This medication is considered safe during pregnancy and is also secreted in breast milk.
Solaraze Pregnancy And Lactation Text: This medication is Pregnancy Category B and is considered safe. There is some data to suggest avoiding during the third trimester. It is unknown if this medication is excreted in breast milk.
Klisyri Counseling:  I discussed with the patient the risks of Klisyri including but not limited to erythema, scaling, itching, weeping, crusting, and pain.
Cyclophosphamide Pregnancy And Lactation Text: This medication is Pregnancy Category D and it isn't considered safe during pregnancy. This medication is excreted in breast milk.
Bactrim Counseling:  I discussed with the patient the risks of sulfa antibiotics including but not limited to GI upset, allergic reaction, drug rash, diarrhea, dizziness, photosensitivity, and yeast infections.  Rarely, more serious reactions can occur including but not limited to aplastic anemia, agranulocytosis, methemoglobinemia, blood dyscrasias, liver or kidney failure, lung infiltrates or desquamative/blistering drug rashes.
Skyrizi Counseling: I discussed with the patient the risks of risankizumab-rzaa including but not limited to immunosuppression, and serious infections.  The patient understands that monitoring is required including a PPD at baseline and must alert us or the primary physician if symptoms of infection or other concerning signs are noted.
Birth Control Pills Counseling: Birth Control Pill Counseling: I discussed with the patient the potential side effects of OCPs including but not limited to increased risk of stroke, heart attack, thrombophlebitis, deep venous thrombosis, hepatic adenomas, breast changes, GI upset, headaches, and depression.  The patient verbalized understanding of the proper use and possible adverse effects of OCPs. All of the patient's questions and concerns were addressed.
Doxepin Pregnancy And Lactation Text: This medication is Pregnancy Category C and it isn't known if it is safe during pregnancy. It is also excreted in breast milk and breast feeding isn't recommended.
Carac Counseling:  I discussed with the patient the risks of Carac including but not limited to erythema, scaling, itching, weeping, crusting, and pain.
Propranolol Pregnancy And Lactation Text: This medication is Pregnancy Category C and it isn't known if it is safe during pregnancy. It is excreted in breast milk.
Benzoyl Peroxide Pregnancy And Lactation Text: This medication is Pregnancy Category C. It is unknown if benzoyl peroxide is excreted in breast milk.
Glycopyrrolate Pregnancy And Lactation Text: This medication is Pregnancy Category B and is considered safe during pregnancy. It is unknown if it is excreted breast milk.
Topical Retinoid counseling:  Patient advised to apply a pea-sized amount only at bedtime and wait 30 minutes after washing their face before applying.  If too drying, patient may add a non-comedogenic moisturizer. The patient verbalized understanding of the proper use and possible adverse effects of retinoids.  All of the patient's questions and concerns were addressed.
Sarecycline Counseling: Patient advised regarding possible photosensitivity and discoloration of the teeth, skin, lips, tongue and gums.  Patient instructed to avoid sunlight, if possible.  When exposed to sunlight, patients should wear protective clothing, sunglasses, and sunscreen.  The patient was instructed to call the office immediately if the following severe adverse effects occur:  hearing changes, easy bruising/bleeding, severe headache, or vision changes.  The patient verbalized understanding of the proper use and possible adverse effects of sarecycline.  All of the patient's questions and concerns were addressed.
Arava Counseling:  Patient counseled regarding adverse effects of Arava including but not limited to nausea, vomiting, abnormalities in liver function tests. Patients may develop mouth sores, rash, diarrhea, and abnormalities in blood counts. The patient understands that monitoring is required including LFTs and blood counts.  There is a rare possibility of scarring of the liver and lung problems that can occur when taking methotrexate. Persistent nausea, loss of appetite, pale stools, dark urine, cough, and shortness of breath should be reported immediately. Patient advised to discontinue Arava treatment and consult with a physician prior to attempting conception. The patient will have to undergo a treatment to eliminate Arava from the body prior to conception.
Hydroxyzine Counseling: Patient advised that the medication is sedating and not to drive a car after taking this medication.  Patient informed of potential adverse effects including but not limited to dry mouth, urinary retention, and blurry vision.  The patient verbalized understanding of the proper use and possible adverse effects of hydroxyzine.  All of the patient's questions and concerns were addressed.
Tetracycline Counseling: Patient counseled regarding possible photosensitivity and increased risk for sunburn.  Patient instructed to avoid sunlight, if possible.  When exposed to sunlight, patients should wear protective clothing, sunglasses, and sunscreen.  The patient was instructed to call the office immediately if the following severe adverse effects occur:  hearing changes, easy bruising/bleeding, severe headache, or vision changes.  The patient verbalized understanding of the proper use and possible adverse effects of tetracycline.  All of the patient's questions and concerns were addressed. Patient understands to avoid pregnancy while on therapy due to potential birth defects.
Tazorac Counseling:  Patient advised that medication is irritating and drying.  Patient may need to apply sparingly and wash off after an hour before eventually leaving it on overnight.  The patient verbalized understanding of the proper use and possible adverse effects of tazorac.  All of the patient's questions and concerns were addressed.
Cyclosporine Counseling:  I discussed with the patient the risks of cyclosporine including but not limited to hypertension, gingival hyperplasia,myelosuppression, immunosuppression, liver damage, kidney damage, neurotoxicity, lymphoma, and serious infections. The patient understands that monitoring is required including baseline blood pressure, CBC, CMP, lipid panel and uric acid, and then 1-2 times monthly CMP and blood pressure.
Bactrim Pregnancy And Lactation Text: This medication is Pregnancy Category D and is known to cause fetal risk.  It is also excreted in breast milk.
Hydroxychloroquine Counseling:  I discussed with the patient that a baseline ophthalmologic exam is needed at the start of therapy and every year thereafter while on therapy. A CBC may also be warranted for monitoring.  The side effects of this medication were discussed with the patient, including but not limited to agranulocytosis, aplastic anemia, seizures, rashes, retinopathy, and liver toxicity. Patient instructed to call the office should any adverse effect occur.  The patient verbalized understanding of the proper use and possible adverse effects of Plaquenil.  All the patient's questions and concerns were addressed.
Dupixent Counseling: I discussed with the patient the risks of dupilumab including but not limited to eye infection and irritation, cold sores, injection site reactions, worsening of asthma, allergic reactions and increased risk of parasitic infection.  Live vaccines should be avoided while taking dupilumab. Dupilumab will also interact with certain medications such as warfarin and cyclosporine. The patient understands that monitoring is required and they must alert us or the primary physician if symptoms of infection or other concerning signs are noted.
Klisyri Pregnancy And Lactation Text: It is unknown if this medication can harm a developing fetus or if it is excreted in breast milk.
Cyclosporine Pregnancy And Lactation Text: This medication is Pregnancy Category C and it isn't know if it is safe during pregnancy. This medication is excreted in breast milk.
Stelara Counseling:  I discussed with the patient the risks of ustekinumab including but not limited to immunosuppression, malignancy, posterior leukoencephalopathy syndrome, and serious infections.  The patient understands that monitoring is required including a PPD at baseline and must alert us or the primary physician if symptoms of infection or other concerning signs are noted.
Calcipotriene Counseling:  I discussed with the patient the risks of calcipotriene including but not limited to erythema, scaling, itching, and irritation.
Hydroxychloroquine Pregnancy And Lactation Text: This medication has been shown to cause fetal harm but it isn't assigned a Pregnancy Risk Category. There are small amounts excreted in breast milk.
Tazorac Pregnancy And Lactation Text: This medication is not safe during pregnancy. It is unknown if this medication is excreted in breast milk.
Enbrel Counseling:  I discussed with the patient the risks of etanercept including but not limited to myelosuppression, immunosuppression, autoimmune hepatitis, demyelinating diseases, lymphoma, and infections.  The patient understands that monitoring is required including a PPD at baseline and must alert us or the primary physician if symptoms of infection or other concerning signs are noted.
Cephalexin Counseling: I counseled the patient regarding use of cephalexin as an antibiotic for prophylactic and/or therapeutic purposes. Cephalexin (commonly prescribed under brand name Keflex) is a cephalosporin antibiotic which is active against numerous classes of bacteria, including most skin bacteria. Side effects may include nausea, diarrhea, gastrointestinal upset, rash, hives, yeast infections, and in rare cases, hepatitis, kidney disease, seizures, fever, confusion, neurologic symptoms, and others. Patients with severe allergies to penicillin medications are cautioned that there is about a 10% incidence of cross-reactivity with cephalosporins. When possible, patients with penicillin allergies should use alternatives to cephalosporins for antibiotic therapy.
Birth Control Pills Pregnancy And Lactation Text: This medication should be avoided if pregnant and for the first 30 days post-partum.
Dupixent Pregnancy And Lactation Text: This medication likely crosses the placenta but the risk for the fetus is uncertain. This medication is excreted in breast milk.
Minoxidil Counseling: Minoxidil is a topical medication which can increase blood flow where it is applied. It is uncertain how this medication increases hair growth. Side effects are uncommon and include stinging and allergic reactions.
Hydroxyzine Pregnancy And Lactation Text: This medication is not safe during pregnancy and should not be taken. It is also excreted in breast milk and breast feeding isn't recommended.
Acitretin Counseling:  I discussed with the patient the risks of acitretin including but not limited to hair loss, dry lips/skin/eyes, liver damage, hyperlipidemia, depression/suicidal ideation, photosensitivity.  Serious rare side effects can include but are not limited to pancreatitis, pseudotumor cerebri, bony changes, clot formation/stroke/heart attack.  Patient understands that alcohol is contraindicated since it can result in liver toxicity and significantly prolong the elimination of the drug by many years.
Methotrexate Counseling:  Patient counseled regarding adverse effects of methotrexate including but not limited to nausea, vomiting, abnormalities in liver function tests. Patients may develop mouth sores, rash, diarrhea, and abnormalities in blood counts. The patient understands that monitoring is required including LFT's and blood counts.  There is a rare possibility of scarring of the liver and lung problems that can occur when taking methotrexate. Persistent nausea, loss of appetite, pale stools, dark urine, cough, and shortness of breath should be reported immediately. Patient advised to discontinue methotrexate treatment at least three months before attempting to become pregnant.  I discussed the need for folate supplements while taking methotrexate.  These supplements can decrease side effects during methotrexate treatment. The patient verbalized understanding of the proper use and possible adverse effects of methotrexate.  All of the patient's questions and concerns were addressed.
Clofazimine Counseling:  I discussed with the patient the risks of clofazimine including but not limited to skin and eye pigmentation, liver damage, nausea/vomiting, gastrointestinal bleeding and allergy.
Clindamycin Counseling: I counseled the patient regarding use of clindamycin as an antibiotic for prophylactic and/or therapeutic purposes. Clindamycin is active against numerous classes of bacteria, including skin bacteria. Side effects may include nausea, diarrhea, gastrointestinal upset, rash, hives, yeast infections, and in rare cases, colitis.
Topical Clindamycin Counseling: Patient counseled that this medication may cause skin irritation or allergic reactions.  In the event of skin irritation, the patient was advised to reduce the amount of the drug applied or use it less frequently.   The patient verbalized understanding of the proper use and possible adverse effects of clindamycin.  All of the patient's questions and concerns were addressed.
Mirvaso Counseling: Mirvaso is a topical medication which can decrease superficial blood flow where applied. Side effects are uncommon and include stinging, redness and allergic reactions.
Spironolactone Pregnancy And Lactation Text: This medication can cause feminization of the male fetus and should be avoided during pregnancy. The active metabolite is also found in breast milk.
Cephalexin Pregnancy And Lactation Text: This medication is Pregnancy Category B and considered safe during pregnancy.  It is also excreted in breast milk but can be used safely for shorter doses.
Spironolactone Counseling: Patient advised regarding risks of diarrhea, abdominal pain, hyperkalemia, birth defects (for female patients), liver toxicity and renal toxicity. The patient may need blood work to monitor liver and kidney function and potassium levels while on therapy. The patient verbalized understanding of the proper use and possible adverse effects of spironolactone.  All of the patient's questions and concerns were addressed.
Calcipotriene Pregnancy And Lactation Text: This medication has not been proven safe during pregnancy. It is unknown if this medication is excreted in breast milk.
Libtayo Counseling- I discussed with the patient the risks of Libtayo including but not limited to nausea, vomiting, diarrhea, and bone or muscle pain.  The patient verbalized understanding of the proper use and possible adverse effects of Libtayo.  All of the patient's questions and concerns were addressed.
SSKI Counseling:  I discussed with the patient the risks of SSKI including but not limited to thyroid abnormalities, metallic taste, GI upset, fever, headache, acne, arthralgias, paraesthesias, lymphadenopathy, easy bleeding, arrhythmias, and allergic reaction.
Acitretin Pregnancy And Lactation Text: This medication is Pregnancy Category X and should not be given to women who are pregnant or may become pregnant in the future. This medication is excreted in breast milk.
Taltz Counseling: I discussed with the patient the risks of ixekizumab including but not limited to immunosuppression, serious infections, worsening of inflammatory bowel disease and drug reactions.  The patient understands that monitoring is required including a PPD at baseline and must alert us or the primary physician if symptoms of infection or other concerning signs are noted.
Clindamycin Pregnancy And Lactation Text: This medication can be used in pregnancy if certain situations. Clindamycin is also present in breast milk.
Humira Counseling:  I discussed with the patient the risks of adalimumab including but not limited to myelosuppression, immunosuppression, autoimmune hepatitis, demyelinating diseases, lymphoma, and serious infections.  The patient understands that monitoring is required including a PPD at baseline and must alert us or the primary physician if symptoms of infection or other concerning signs are noted.
Niacinamide Counseling: I recommended taking niacin or niacinamide, also know as vitamin B3, twice daily. Recent evidence suggests that taking vitamin B3 (500 mg twice daily) can reduce the risk of actinic keratoses and non-melanoma skin cancers. Side effects of vitamin B3 include flushing and headache.
Libtayo Pregnancy And Lactation Text: This medication is contraindicated in pregnancy and when breast feeding.
Albendazole Counseling:  I discussed with the patient the risks of albendazole including but not limited to cytopenia, kidney damage, nausea/vomiting and severe allergy.  The patient understands that this medication is being used in an off-label manner.
Fluconazole Counseling:  Patient counseled regarding adverse effects of fluconazole including but not limited to headache, diarrhea, nausea, upset stomach, liver function test abnormalities, taste disturbance, and stomach pain.  There is a rare possibility of liver failure that can occur when taking fluconazole.  The patient understands that monitoring of LFTs and kidney function test may be required, especially at baseline. The patient verbalized understanding of the proper use and possible adverse effects of fluconazole.  All of the patient's questions and concerns were addressed.
Methotrexate Pregnancy And Lactation Text: This medication is Pregnancy Category X and is known to cause fetal harm. This medication is excreted in breast milk.
Topical Ketoconazole Counseling: Patient counseled that this medication may cause skin irritation or allergic reactions.  In the event of skin irritation, the patient was advised to reduce the amount of the drug applied or use it less frequently.   The patient verbalized understanding of the proper use and possible adverse effects of ketoconazole.  All of the patient's questions and concerns were addressed.
Doxycycline Counseling:  Patient counseled regarding possible photosensitivity and increased risk for sunburn.  Patient instructed to avoid sunlight, if possible.  When exposed to sunlight, patients should wear protective clothing, sunglasses, and sunscreen.  The patient was instructed to call the office immediately if the following severe adverse effects occur:  hearing changes, easy bruising/bleeding, severe headache, or vision changes.  The patient verbalized understanding of the proper use and possible adverse effects of doxycycline.  All of the patient's questions and concerns were addressed.
Sski Pregnancy And Lactation Text: This medication is Pregnancy Category D and isn't considered safe during pregnancy. It is excreted in breast milk.
Niacinamide Pregnancy And Lactation Text: These medications are considered safe during pregnancy.
Opzelura Counseling:  I discussed with the patient the risks of Opzelura including but not limited to nasopharngitis, bronchitis, ear infection, eosinophila, hives, diarrhea, folliculitis, tonsillitis, and rhinorrhea.  Taken orally, this medication has been linked to serious infections; higher rate of mortality; malignancy and lymphoproliferative disorders; major adverse cardiovascular events; thrombosis; thrombocytopenia, anemia, and neutropenia; and lipid elevations.
Prednisone Counseling:  I discussed with the patient the risks of prolonged use of prednisone including but not limited to weight gain, insomnia, osteoporosis, mood changes, diabetes, susceptibility to infection, glaucoma and high blood pressure.  In cases where prednisone use is prolonged, patients should be monitored with blood pressure checks, serum glucose levels and an eye exam.  Additionally, the patient may need to be placed on GI prophylaxis, PCP prophylaxis, and calcium and vitamin D supplementation and/or a bisphosphonate.  The patient verbalized understanding of the proper use and the possible adverse effects of prednisone.  All of the patient's questions and concerns were addressed.
Bexarotene Counseling:  I discussed with the patient the risks of bexarotene including but not limited to hair loss, dry lips/skin/eyes, liver abnormalities, hyperlipidemia, pancreatitis, depression/suicidal ideation, photosensitivity, drug rash/allergic reactions, hypothyroidism, anemia, leukopenia, infection, cataracts, and teratogenicity.  Patient understands that they will need regular blood tests to check lipid profile, liver function tests, white blood cell count, thyroid function tests and pregnancy test if applicable.
Colchicine Counseling:  Patient counseled regarding adverse effects including but not limited to stomach upset (nausea, vomiting, stomach pain, or diarrhea).  Patient instructed to limit alcohol consumption while taking this medication.  Colchicine may reduce blood counts especially with prolonged use.  The patient understands that monitoring of kidney function and blood counts may be required, especially at baseline. The patient verbalized understanding of the proper use and possible adverse effects of colchicine.  All of the patient's questions and concerns were addressed.
Albendazole Pregnancy And Lactation Text: This medication is Pregnancy Category C and it isn't known if it is safe during pregnancy. It is also excreted in breast milk.
Cantharidin Pregnancy And Lactation Text: The use of this medication during pregnancy or lactation is not recommended as there is insufficient data.
Nsaids Counseling: NSAID Counseling: I discussed with the patient that NSAIDs should be taken with food. Prolonged use of NSAIDs can result in the development of stomach ulcers.  Patient advised to stop taking NSAIDs if abdominal pain occurs.  The patient verbalized understanding of the proper use and possible adverse effects of NSAIDs.  All of the patient's questions and concerns were addressed.
Ilumya Counseling: I discussed with the patient the risks of tildrakizumab including but not limited to immunosuppression, malignancy, posterior leukoencephalopathy syndrome, and serious infections.  The patient understands that monitoring is required including a PPD at baseline and must alert us or the primary physician if symptoms of infection or other concerning signs are noted.
Bexarotene Pregnancy And Lactation Text: This medication is Pregnancy Category X and should not be given to women who are pregnant or may become pregnant. This medication should not be used if you are breast feeding.
Opzelura Pregnancy And Lactation Text: There is insufficient data to evaluate drug-associated risk for major birth defects, miscarriage, or other adverse maternal or fetal outcomes.  There is a pregnancy registry that monitors pregnancy outcomes in pregnant persons exposed to the medication during pregnancy.  It is unknown if this medication is excreted in breast milk.  Do not breastfeed during treatment and for about 4 weeks after the last dose.
Dapsone Counseling: I discussed with the patient the risks of dapsone including but not limited to hemolytic anemia, agranulocytosis, rashes, methemoglobinemia, kidney failure, peripheral neuropathy, headaches, GI upset, and liver toxicity.  Patients who start dapsone require monitoring including baseline LFTs and weekly CBCs for the first month, then every month thereafter.  The patient verbalized understanding of the proper use and possible adverse effects of dapsone.  All of the patient's questions and concerns were addressed.
Ivermectin Counseling:  Patient instructed to take medication on an empty stomach with a full glass of water.  Patient informed of potential adverse effects including but not limited to nausea, diarrhea, dizziness, itching, and swelling of the extremities or lymph nodes.  The patient verbalized understanding of the proper use and possible adverse effects of ivermectin.  All of the patient's questions and concerns were addressed.
5-Fu Counseling: 5-Fluorouracil Counseling:  I discussed with the patient the risks of 5-fluorouracil including but not limited to erythema, scaling, itching, weeping, crusting, and pain.
Griseofulvin Counseling:  I discussed with the patient the risks of griseofulvin including but not limited to photosensitivity, cytopenia, liver damage, nausea/vomiting and severe allergy.  The patient understands that this medication is best absorbed when taken with a fatty meal (e.g., ice cream or french fries).
Tremfya Counseling: I discussed with the patient the risks of guselkumab including but not limited to immunosuppression, serious infections, and drug reactions.  The patient understands that monitoring is required including a PPD at baseline and must alert us or the primary physician if symptoms of infection or other concerning signs are noted.
Doxycycline Pregnancy And Lactation Text: This medication is Pregnancy Category D and not consider safe during pregnancy. It is also excreted in breast milk but is considered safe for shorter treatment courses.
Thalidomide Counseling: I discussed with the patient the risks of thalidomide including but not limited to birth defects, anxiety, weakness, chest pain, dizziness, cough and severe allergy.
Picato Counseling:  I discussed with the patient the risks of Picato including but not limited to erythema, scaling, itching, weeping, crusting, and pain.
Erythromycin Counseling:  I discussed with the patient the risks of erythromycin including but not limited to GI upset, allergic reaction, drug rash, diarrhea, increase in liver enzymes, and yeast infections.
Xeljanz Counseling: I discussed with the patient the risks of Xeljanz therapy including increased risk of infection, liver issues, headache, diarrhea, or cold symptoms. Live vaccines should be avoided. They were instructed to call if they have any problems.
Dapsone Pregnancy And Lactation Text: This medication is Pregnancy Category C and is not considered safe during pregnancy or breast feeding.
Tranexamic Acid Counseling:  Patient advised of the small risk of bleeding problems with tranexamic acid. They were also instructed to call if they developed any nausea, vomiting or diarrhea. All of the patient's questions and concerns were addressed.
Drysol Counseling:  I discussed with the patient the risks of drysol/aluminum chloride including but not limited to skin rash, itching, irritation, burning.
Nsaids Pregnancy And Lactation Text: These medications are considered safe up to 30 weeks gestation. It is excreted in breast milk.
Topical Sulfur Applications Counseling: Topical Sulfur Counseling: Patient counseled that this medication may cause skin irritation or allergic reactions.  In the event of skin irritation, the patient was advised to reduce the amount of the drug applied or use it less frequently.   The patient verbalized understanding of the proper use and possible adverse effects of topical sulfur application.  All of the patient's questions and concerns were addressed.
Isotretinoin Counseling: Patient should get monthly blood tests, not donate blood, not drive at night if vision affected, not share medication, and not undergo elective surgery for 6 months after tx completed. Side effects reviewed, pt to contact office should one occur.
Griseofulvin Pregnancy And Lactation Text: This medication is Pregnancy Category X and is known to cause serious birth defects. It is unknown if this medication is excreted in breast milk but breast feeding should be avoided.
Aklief counseling:  Patient advised to apply a pea-sized amount only at bedtime and wait 30 minutes after washing their face before applying.  If too drying, patient may add a non-comedogenic moisturizer.  The most commonly reported side effects including irritation, redness, scaling, dryness, stinging, burning, itching, and increased risk of sunburn.  The patient verbalized understanding of the proper use and possible adverse effects of retinoids.  All of the patient's questions and concerns were addressed.
Olumiant Counseling: I discussed with the patient the risks of Olumiant therapy including but not limited to upper respiratory tract infections, shingles, cold sores, and nausea. Live vaccines should be avoided.  This medication has been linked to serious infections; higher rate of mortality; malignancy and lymphoproliferative disorders; major adverse cardiovascular events; thrombosis; gastrointestinal perforations; neutropenia; lymphopenia; anemia; liver enzyme elevations; and lipid elevations.
Aklief Pregnancy And Lactation Text: It is unknown if this medication is safe to use during pregnancy.  It is unknown if this medication is excreted in breast milk.  Breastfeeding women should use the topical cream on the smallest area of the skin for the shortest time needed while breastfeeding.  Do not apply to nipple and areola.
Olumiant Pregnancy And Lactation Text: Based on animal studies, Olumiant may cause embryo-fetal harm when administered to pregnant women.  The medication should not be used in pregnancy.  Breastfeeding is not recommended during treatment.

## 2022-06-17 NOTE — PROCEDURE: TREATMENT REGIMEN
Samples Given: cetaphil gentle cleanser
Detail Level: Detailed
Plan: RTC in two weeks.
Continue Regimen: Alclometasone ointment \\napply a thin layer to affected areas twice daily x 2 weeks
Discontinue Regimen: Tacrolimus ointment\\n
Otc Regimen: cetaphil or dove sensitive

## 2022-06-17 NOTE — PROCEDURE: COUNSELING
Detail Level: Detailed
Patient Specific Counseling (Will Not Stick From Patient To Patient): This patient is being followed by Ky Gomez PA-C and on her last visit, Ky stated that if she wasn't better to consider a biopsy and she may also consider patch testing as well.  Will try Elidel cream at her f/u visit\\n\\nShe is better - can see when compared to photo she had that was added to her chart.  Today and earlier photo.  She is still itching so will continue the aclometasone for another two weeks.  She states that the area itches a lot when she is on her period or gets stressed.

## 2022-10-07 ENCOUNTER — APPOINTMENT (RX ONLY)
Dept: URBAN - METROPOLITAN AREA CLINIC 41 | Facility: CLINIC | Age: 42
Setting detail: DERMATOLOGY
End: 2022-10-07

## 2022-10-07 DIAGNOSIS — L28.0 LICHEN SIMPLEX CHRONICUS: ICD-10-CM

## 2022-10-07 PROBLEM — L30.9 DERMATITIS, UNSPECIFIED: Status: ACTIVE | Noted: 2022-10-07

## 2022-10-07 PROCEDURE — ? PRESCRIPTION

## 2022-10-07 PROCEDURE — 99213 OFFICE O/P EST LOW 20 MIN: CPT

## 2022-10-07 PROCEDURE — ? ADDITIONAL NOTES

## 2022-10-07 PROCEDURE — ? MEDICATION COUNSELING

## 2022-10-07 PROCEDURE — ? COUNSELING

## 2022-10-07 PROCEDURE — ? TREATMENT REGIMEN

## 2022-10-07 RX ORDER — TAPINAROF 10 MG/1000MG
CREAM TOPICAL QD
Qty: 60 | Refills: 2 | Status: ERX | COMMUNITY
Start: 2022-10-07

## 2022-10-07 RX ADMIN — TAPINAROF: 10 CREAM TOPICAL at 00:00

## 2022-10-07 ASSESSMENT — LOCATION SIMPLE DESCRIPTION DERM: LOCATION SIMPLE: VULVA

## 2022-10-07 ASSESSMENT — LOCATION ZONE DERM: LOCATION ZONE: VULVA

## 2022-10-07 ASSESSMENT — LOCATION DETAILED DESCRIPTION DERM: LOCATION DETAILED: RIGHT LABIA MAJORA

## 2022-10-07 NOTE — PROCEDURE: ADDITIONAL NOTES
Render Risk Assessment In Note?: no
Detail Level: Simple
Additional Notes: Patient already had biopsy done in past and believes the results stated psoriasis.

## 2022-10-07 NOTE — PROCEDURE: TREATMENT REGIMEN
Samples Given: cetaphil gentle cleanser
Detail Level: Detailed
Plan: Clinically this looks like LSC or psoriasis. Biopsy would be needed to confirm. She has tried topical steroids and tacrolimus, but has not seen as much improvement as she would like. Photos shown by patient show some improvement.  We will try Vtama and follow up. \\n
Discontinue Regimen: Tacrolimus ointment & Alclometasone ointment
Otc Regimen: cetaphil or dove sensitive
Initiate Treatment: Vtama: Apply thin layer to affected area in groin once daily x8 weeks

## 2022-10-12 ENCOUNTER — RX ONLY (OUTPATIENT)
Age: 42
Setting detail: RX ONLY
End: 2022-10-12

## 2022-10-12 RX ORDER — ALCLOMETASONE DIPROPIONATE 0.5 MG/G
THIN LAYER OINTMENT TOPICAL BID
Qty: 45 | Refills: 0 | Status: ERX

## 2022-10-12 RX ORDER — TACROLIMUS 1 MG/G
THIN LAYER OINTMENT TOPICAL BID
Qty: 60 | Refills: 0 | Status: ERX

## 2022-12-02 ENCOUNTER — APPOINTMENT (RX ONLY)
Dept: URBAN - METROPOLITAN AREA CLINIC 41 | Facility: CLINIC | Age: 42
Setting detail: DERMATOLOGY
End: 2022-12-02

## 2022-12-02 DIAGNOSIS — L28.0 LICHEN SIMPLEX CHRONICUS: ICD-10-CM | Status: IMPROVED

## 2022-12-02 PROBLEM — L30.9 DERMATITIS, UNSPECIFIED: Status: ACTIVE | Noted: 2022-12-02

## 2022-12-02 PROCEDURE — 99213 OFFICE O/P EST LOW 20 MIN: CPT

## 2022-12-02 PROCEDURE — ? TREATMENT REGIMEN

## 2022-12-02 PROCEDURE — ? MEDICATION COUNSELING

## 2022-12-02 PROCEDURE — ? COUNSELING

## 2022-12-02 ASSESSMENT — LOCATION SIMPLE DESCRIPTION DERM: LOCATION SIMPLE: VULVA

## 2022-12-02 ASSESSMENT — LOCATION DETAILED DESCRIPTION DERM: LOCATION DETAILED: RIGHT LABIA MAJORA

## 2022-12-02 ASSESSMENT — LOCATION ZONE DERM: LOCATION ZONE: VULVA

## 2022-12-02 NOTE — PROCEDURE: TREATMENT REGIMEN
Discontinue Regimen: Alclometasone - resume twice daily x7 days if needed for itching
Otc Regimen: -advised pt to use latex free pads and condoms \\n-advised her to change her panty liners in case she is having an allergic reaction to something in the liners\\n-advised she use pH balanced washes such as summerciara avila
Detail Level: Zone
Plan: Patient has improved greatly since last visit. No visible patches or plaques. Will continue tacrolimus for maintenance.
Continue Regimen: Tacrolimus - apply to affected areas in groin once daily for maintenance

## 2022-12-02 NOTE — PROCEDURE: MEDICATION COUNSELING
Opioid Counseling: I discussed with the patient the potential side effects of opioids including but not limited to addiction, altered mental status, and depression. I stressed avoiding alcohol, benzodiazepines, muscle relaxants and sleep aids unless specifically okayed by a physician. The patient verbalized understanding of the proper use and possible adverse effects of opioids. All of the patient's questions and concerns were addressed. They were instructed to flush the remaining pills down the toilet if they did not need them for pain.
Methotrexate Pregnancy And Lactation Text: This medication is Pregnancy Category X and is known to cause fetal harm. This medication is excreted in breast milk.
Ketoconazole Counseling:   Patient counseled regarding improving absorption with orange juice.  Adverse effects include but are not limited to breast enlargement, headache, diarrhea, nausea, upset stomach, liver function test abnormalities, taste disturbance, and stomach pain.  There is a rare possibility of liver failure that can occur when taking ketoconazole. The patient understands that monitoring of LFTs may be required, especially at baseline. The patient verbalized understanding of the proper use and possible adverse effects of ketoconazole.  All of the patient's questions and concerns were addressed.
Cimzia Pregnancy And Lactation Text: This medication crosses the placenta but can be considered safe in certain situations. Cimzia may be excreted in breast milk.
Rifampin Pregnancy And Lactation Text: This medication is Pregnancy Category C and it isn't know if it is safe during pregnancy. It is also excreted in breast milk and should not be used if you are breast feeding.
Niacinamide Counseling: I recommended taking niacin or niacinamide, also know as vitamin B3, twice daily. Recent evidence suggests that taking vitamin B3 (500 mg twice daily) can reduce the risk of actinic keratoses and non-melanoma skin cancers. Side effects of vitamin B3 include flushing and headache.
Imiquimod Pregnancy And Lactation Text: This medication is Pregnancy Category C. It is unknown if this medication is excreted in breast milk.
Wartpeel Pregnancy And Lactation Text: This medication is Pregnancy Category X and contraindicated in pregnancy and in women who may become pregnant. It is unknown if this medication is excreted in breast milk.
Thalidomide Counseling: I discussed with the patient the risks of thalidomide including but not limited to birth defects, anxiety, weakness, chest pain, dizziness, cough and severe allergy.
Erythromycin Counseling:  I discussed with the patient the risks of erythromycin including but not limited to GI upset, allergic reaction, drug rash, diarrhea, increase in liver enzymes, and yeast infections.
Benzoyl Peroxide Counseling: Patient counseled that medicine may cause skin irritation and bleach clothing.  In the event of skin irritation, the patient was advised to reduce the amount of the drug applied or use it less frequently.   The patient verbalized understanding of the proper use and possible adverse effects of benzoyl peroxide.  All of the patient's questions and concerns were addressed.
Dapsone Pregnancy And Lactation Text: This medication is Pregnancy Category C and is not considered safe during pregnancy or breast feeding.
Skyrizi Counseling: I discussed with the patient the risks of risankizumab-rzaa including but not limited to immunosuppression, and serious infections.  The patient understands that monitoring is required including a PPD at baseline and must alert us or the primary physician if symptoms of infection or other concerning signs are noted.
Tazorac Counseling:  Patient advised that medication is irritating and drying.  Patient may need to apply sparingly and wash off after an hour before eventually leaving it on overnight.  The patient verbalized understanding of the proper use and possible adverse effects of tazorac.  All of the patient's questions and concerns were addressed.
Xolair Pregnancy And Lactation Text: This medication is Pregnancy Category B and is considered safe during pregnancy. This medication is excreted in breast milk.
Hydroxyzine Pregnancy And Lactation Text: This medication is not safe during pregnancy and should not be taken. It is also excreted in breast milk and breast feeding isn't recommended.
Azithromycin Pregnancy And Lactation Text: This medication is considered safe during pregnancy and is also secreted in breast milk.
Protopic Pregnancy And Lactation Text: This medication is Pregnancy Category C. It is unknown if this medication is excreted in breast milk when applied topically.
Benzoyl Peroxide Pregnancy And Lactation Text: This medication is Pregnancy Category C. It is unknown if benzoyl peroxide is excreted in breast milk.
Azathioprine Counseling:  I discussed with the patient the risks of azathioprine including but not limited to myelosuppression, immunosuppression, hepatotoxicity, lymphoma, and infections.  The patient understands that monitoring is required including baseline LFTs, Creatinine, possible TPMP genotyping and weekly CBCs for the first month and then every 2 weeks thereafter.  The patient verbalized understanding of the proper use and possible adverse effects of azathioprine.  All of the patient's questions and concerns were addressed.
Cosentyx Counseling:  I discussed with the patient the risks of Cosentyx including but not limited to worsening of Crohn's disease, immunosuppression, allergic reactions and infections.  The patient understands that monitoring is required including a PPD at baseline and must alert us or the primary physician if symptoms of infection or other concerning signs are noted.
Finasteride Male Counseling: Finasteride Counseling:  I discussed with the patient the risks of use of finasteride including but not limited to decreased libido, decreased ejaculate volume, gynecomastia, and depression. Women should not handle medication.  All of the patient's questions and concerns were addressed.
Sotyktu Counseling:  I discussed the most common side effects of Sotyktu including: common cold, sore throat, sinus infections, cold sores, canker sores, folliculitis, and acne.? I also discussed more serious side effects of Sotyktu including but not limited to: serious allergic reactions; increased risk for infections such as TB; cancers such as lymphomas; rhabdomyolysis and elevated CPK; and elevated triglycerides and liver enzymes.?
Winlevi Counseling:  I discussed with the patient the risks of topical clascoterone including but not limited to erythema, scaling, itching, and stinging. Patient voiced their understanding.
Isotretinoin Pregnancy And Lactation Text: This medication is Pregnancy Category X and is considered extremely dangerous during pregnancy. It is unknown if it is excreted in breast milk.
Minoxidil Counseling: Minoxidil is a topical medication which can increase blood flow where it is applied. It is uncertain how this medication increases hair growth. Side effects are uncommon and include stinging and allergic reactions.
Thalidomide Pregnancy And Lactation Text: This medication is Pregnancy Category X and is absolutely contraindicated during pregnancy. It is unknown if it is excreted in breast milk.
Drysol Counseling:  I discussed with the patient the risks of drysol/aluminum chloride including but not limited to skin rash, itching, irritation, burning.
Ilumya Pregnancy And Lactation Text: The risk during pregnancy and breastfeeding is uncertain with this medication.
Erythromycin Pregnancy And Lactation Text: This medication is Pregnancy Category B and is considered safe during pregnancy. It is also excreted in breast milk.
Tazorac Pregnancy And Lactation Text: This medication is not safe during pregnancy. It is unknown if this medication is excreted in breast milk.
Sotyktu Pregnancy And Lactation Text: There is insufficient data to evaluate whether or not Sotyktu is safe to use during pregnancy.? ?It is not known if Sotyktu passes into breast milk and whether or not it is safe to use when breastfeeding.??
Niacinamide Pregnancy And Lactation Text: These medications are considered safe during pregnancy.
Opioid Pregnancy And Lactation Text: These medications can lead to premature delivery and should be avoided during pregnancy. These medications are also present in breast milk in small amounts.
Ketoconazole Pregnancy And Lactation Text: This medication is Pregnancy Category C and it isn't know if it is safe during pregnancy. It is also excreted in breast milk and breast feeding isn't recommended.
Azathioprine Pregnancy And Lactation Text: This medication is Pregnancy Category D and isn't considered safe during pregnancy. It is unknown if this medication is excreted in breast milk.
Qbrexza Counseling:  I discussed with the patient the risks of Qbrexza including but not limited to headache, mydriasis, blurred vision, dry eyes, nasal dryness, dry mouth, dry throat, dry skin, urinary hesitation, and constipation.  Local skin reactions including erythema, burning, stinging, and itching can also occur.
Prednisone Counseling:  I discussed with the patient the risks of prolonged use of prednisone including but not limited to weight gain, insomnia, osteoporosis, mood changes, diabetes, susceptibility to infection, glaucoma and high blood pressure.  In cases where prednisone use is prolonged, patients should be monitored with blood pressure checks, serum glucose levels and an eye exam.  Additionally, the patient may need to be placed on GI prophylaxis, PCP prophylaxis, and calcium and vitamin D supplementation and/or a bisphosphonate.  The patient verbalized understanding of the proper use and the possible adverse effects of prednisone.  All of the patient's questions and concerns were addressed.
Klisyri Counseling:  I discussed with the patient the risks of Klisyri including but not limited to erythema, scaling, itching, weeping, crusting, and pain.
Sarecycline Counseling: Patient advised regarding possible photosensitivity and discoloration of the teeth, skin, lips, tongue and gums.  Patient instructed to avoid sunlight, if possible.  When exposed to sunlight, patients should wear protective clothing, sunglasses, and sunscreen.  The patient was instructed to call the office immediately if the following severe adverse effects occur:  hearing changes, easy bruising/bleeding, severe headache, or vision changes.  The patient verbalized understanding of the proper use and possible adverse effects of sarecycline.  All of the patient's questions and concerns were addressed.
Gabapentin Counseling: I discussed with the patient the risks of gabapentin including but not limited to dizziness, somnolence, fatigue and ataxia.
Albendazole Counseling:  I discussed with the patient the risks of albendazole including but not limited to cytopenia, kidney damage, nausea/vomiting and severe allergy.  The patient understands that this medication is being used in an off-label manner.
Tranexamic Acid Counseling:  Patient advised of the small risk of bleeding problems with tranexamic acid. They were also instructed to call if they developed any nausea, vomiting or diarrhea. All of the patient's questions and concerns were addressed.
Infliximab Counseling:  I discussed with the patient the risks of infliximab including but not limited to myelosuppression, immunosuppression, autoimmune hepatitis, demyelinating diseases, lymphoma, and serious infections.  The patient understands that monitoring is required including a PPD at baseline and must alert us or the primary physician if symptoms of infection or other concerning signs are noted.
Prednisone Pregnancy And Lactation Text: This medication is Pregnancy Category C and it isn't know if it is safe during pregnancy. This medication is excreted in breast milk.
Sarecycline Pregnancy And Lactation Text: This medication is Pregnancy Category D and not consider safe during pregnancy. It is also excreted in breast milk.
Erivedge Counseling- I discussed with the patient the risks of Erivedge including but not limited to nausea, vomiting, diarrhea, constipation, weight loss, changes in the sense of taste, decreased appetite, muscle spasms, and hair loss.  The patient verbalized understanding of the proper use and possible adverse effects of Erivedge.  All of the patient's questions and concerns were addressed.
Klisyri Pregnancy And Lactation Text: It is unknown if this medication can harm a developing fetus or if it is excreted in breast milk.
Drysol Pregnancy And Lactation Text: This medication is considered safe during pregnancy and breast feeding.
Nsaids Counseling: NSAID Counseling: I discussed with the patient that NSAIDs should be taken with food. Prolonged use of NSAIDs can result in the development of stomach ulcers.  Patient advised to stop taking NSAIDs if abdominal pain occurs.  The patient verbalized understanding of the proper use and possible adverse effects of NSAIDs.  All of the patient's questions and concerns were addressed.
Terbinafine Counseling: Patient counseling regarding adverse effects of terbinafine including but not limited to headache, diarrhea, rash, upset stomach, liver function test abnormalities, itching, taste/smell disturbance, nausea, abdominal pain, and flatulence.  There is a rare possibility of liver failure that can occur when taking terbinafine.  The patient understands that a baseline LFT and kidney function test may be required. The patient verbalized understanding of the proper use and possible adverse effects of terbinafine.  All of the patient's questions and concerns were addressed.
High Dose Vitamin A Counseling: Side effects reviewed, pt to contact office should one occur.
Bactrim Counseling:  I discussed with the patient the risks of sulfa antibiotics including but not limited to GI upset, allergic reaction, drug rash, diarrhea, dizziness, photosensitivity, and yeast infections.  Rarely, more serious reactions can occur including but not limited to aplastic anemia, agranulocytosis, methemoglobinemia, blood dyscrasias, liver or kidney failure, lung infiltrates or desquamative/blistering drug rashes.
Winlevi Pregnancy And Lactation Text: This medication is considered safe during pregnancy and breastfeeding.
Minoxidil Pregnancy And Lactation Text: This medication has not been assigned a Pregnancy Risk Category but animal studies failed to show danger with the topical medication. It is unknown if the medication is excreted in breast milk.
Carac Counseling:  I discussed with the patient the risks of Carac including but not limited to erythema, scaling, itching, weeping, crusting, and pain.
Cosentyx Pregnancy And Lactation Text: This medication is Pregnancy Category B and is considered safe during pregnancy. It is unknown if this medication is excreted in breast milk.
Elidel Counseling: Patient may experience a mild burning sensation during topical application. Elidel is not approved in children less than 2 years of age. There have been case reports of hematologic and skin malignancies in patients using topical calcineurin inhibitors although causality is questionable.
Metronidazole Counseling:  I discussed with the patient the risks of metronidazole including but not limited to seizures, nausea/vomiting, a metallic taste in the mouth, nausea/vomiting and severe allergy.
Gabapentin Pregnancy And Lactation Text: This medication is Pregnancy Category C and isn't considered safe during pregnancy. It is excreted in breast milk.
Topical Clindamycin Counseling: Patient counseled that this medication may cause skin irritation or allergic reactions.  In the event of skin irritation, the patient was advised to reduce the amount of the drug applied or use it less frequently.   The patient verbalized understanding of the proper use and possible adverse effects of clindamycin.  All of the patient's questions and concerns were addressed.
High Dose Vitamin A Pregnancy And Lactation Text: High dose vitamin A therapy is contraindicated during pregnancy and breast feeding.
Otezla Pregnancy And Lactation Text: This medication is Pregnancy Category C and it isn't known if it is safe during pregnancy. It is unknown if it is excreted in breast milk.
Bactrim Pregnancy And Lactation Text: This medication is Pregnancy Category D and is known to cause fetal risk.  It is also excreted in breast milk.
Arava Counseling:  Patient counseled regarding adverse effects of Arava including but not limited to nausea, vomiting, abnormalities in liver function tests. Patients may develop mouth sores, rash, diarrhea, and abnormalities in blood counts. The patient understands that monitoring is required including LFTs and blood counts.  There is a rare possibility of scarring of the liver and lung problems that can occur when taking methotrexate. Persistent nausea, loss of appetite, pale stools, dark urine, cough, and shortness of breath should be reported immediately. Patient advised to discontinue Arava treatment and consult with a physician prior to attempting conception. The patient will have to undergo a treatment to eliminate Arava from the body prior to conception.
Qbrexza Pregnancy And Lactation Text: There is no available data on Qbrexza use in pregnant women.  There is no available data on Qbrexza use in lactation.
Xeljanz Counseling: I discussed with the patient the risks of Xeljanz therapy including increased risk of infection, liver issues, headache, diarrhea, or cold symptoms. Live vaccines should be avoided. They were instructed to call if they have any problems.
Stelara Counseling:  I discussed with the patient the risks of ustekinumab including but not limited to immunosuppression, malignancy, posterior leukoencephalopathy syndrome, and serious infections.  The patient understands that monitoring is required including a PPD at baseline and must alert us or the primary physician if symptoms of infection or other concerning signs are noted.
Cellcept Counseling:  I discussed with the patient the risks of mycophenolate mofetil including but not limited to infection/immunosuppression, GI upset, hypokalemia, hypercholesterolemia, bone marrow suppression, lymphoproliferative disorders, malignancy, GI ulceration/bleed/perforation, colitis, interstitial lung disease, kidney failure, progressive multifocal leukoencephalopathy, and birth defects.  The patient understands that monitoring is required including a baseline creatinine and regular CBC testing. In addition, patient must alert us immediately if symptoms of infection or other concerning signs are noted.
Tetracycline Counseling: Patient counseled regarding possible photosensitivity and increased risk for sunburn.  Patient instructed to avoid sunlight, if possible.  When exposed to sunlight, patients should wear protective clothing, sunglasses, and sunscreen.  The patient was instructed to call the office immediately if the following severe adverse effects occur:  hearing changes, easy bruising/bleeding, severe headache, or vision changes.  The patient verbalized understanding of the proper use and possible adverse effects of tetracycline.  All of the patient's questions and concerns were addressed. Patient understands to avoid pregnancy while on therapy due to potential birth defects.
Terbinafine Pregnancy And Lactation Text: This medication is Pregnancy Category B and is considered safe during pregnancy. It is also excreted in breast milk and breast feeding isn't recommended.
VTAMA Counseling: I discussed with the patient that VTAMA is not for use in the eyes, mouth or mouth. They should call the office if they develop any signs of allergic reactions to VTAMA. The patient verbalized understanding of the proper use and possible adverse effects of VTAMA.  All of the patient's questions and concerns were addressed.
Mirvaso Counseling: Mirvaso is a topical medication which can decrease superficial blood flow where applied. Side effects are uncommon and include stinging, redness and allergic reactions.
Albendazole Pregnancy And Lactation Text: This medication is Pregnancy Category C and it isn't known if it is safe during pregnancy. It is also excreted in breast milk.
Tranexamic Acid Pregnancy And Lactation Text: It is unknown if this medication is safe during pregnancy or breast feeding.
Topical Clindamycin Pregnancy And Lactation Text: This medication is Pregnancy Category B and is considered safe during pregnancy. It is unknown if it is excreted in breast milk.
Glycopyrrolate Counseling:  I discussed with the patient the risks of glycopyrrolate including but not limited to skin rash, drowsiness, dry mouth, difficulty urinating, and blurred vision.
Oxybutynin Counseling:  I discussed with the patient the risks of oxybutynin including but not limited to skin rash, drowsiness, dry mouth, difficulty urinating, and blurred vision.
Dupixent Counseling: I discussed with the patient the risks of dupilumab including but not limited to eye infection and irritation, cold sores, injection site reactions, worsening of asthma, allergic reactions and increased risk of parasitic infection.  Live vaccines should be avoided while taking dupilumab. Dupilumab will also interact with certain medications such as warfarin and cyclosporine. The patient understands that monitoring is required and they must alert us or the primary physician if symptoms of infection or other concerning signs are noted.
Nsaids Pregnancy And Lactation Text: These medications are considered safe up to 30 weeks gestation. It is excreted in breast milk.
Libtayo Counseling- I discussed with the patient the risks of Libtayo including but not limited to nausea, vomiting, diarrhea, and bone or muscle pain.  The patient verbalized understanding of the proper use and possible adverse effects of Libtayo.  All of the patient's questions and concerns were addressed.
Cephalexin Counseling: I counseled the patient regarding use of cephalexin as an antibiotic for prophylactic and/or therapeutic purposes. Cephalexin (commonly prescribed under brand name Keflex) is a cephalosporin antibiotic which is active against numerous classes of bacteria, including most skin bacteria. Side effects may include nausea, diarrhea, gastrointestinal upset, rash, hives, yeast infections, and in rare cases, hepatitis, kidney disease, seizures, fever, confusion, neurologic symptoms, and others. Patients with severe allergies to penicillin medications are cautioned that there is about a 10% incidence of cross-reactivity with cephalosporins. When possible, patients with penicillin allergies should use alternatives to cephalosporins for antibiotic therapy.
Rhofade Counseling: Rhofade is a topical medication which can decrease superficial blood flow where applied. Side effects are uncommon and include stinging, redness and allergic reactions.
Cibinqo Counseling: I discussed with the patient the risks of Cibinqo therapy including but not limited to common cold, nausea, headache, cold sores, increased blood CPK levels, dizziness, UTIs, fatigue, acne, and vomitting. Live vaccines should be avoided.  This medication has been linked to serious infections; higher rate of mortality; malignancy and lymphoproliferative disorders; major adverse cardiovascular events; thrombosis; thrombocytopenia and lymphopenia; lipid elevations; and retinal detachment.
Fluconazole Counseling:  Patient counseled regarding adverse effects of fluconazole including but not limited to headache, diarrhea, nausea, upset stomach, liver function test abnormalities, taste disturbance, and stomach pain.  There is a rare possibility of liver failure that can occur when taking fluconazole.  The patient understands that monitoring of LFTs and kidney function test may be required, especially at baseline. The patient verbalized understanding of the proper use and possible adverse effects of fluconazole.  All of the patient's questions and concerns were addressed.
Xelfrankiez Pregnancy And Lactation Text: This medication is Pregnancy Category D and is not considered safe during pregnancy.  The risk during breast feeding is also uncertain.
Vtama Pregnancy And Lactation Text: It is unknown if this medication can cause problems during pregnancy and breastfeeding.
Ivermectin Counseling:  Patient instructed to take medication on an empty stomach with a full glass of water.  Patient informed of potential adverse effects including but not limited to nausea, diarrhea, dizziness, itching, and swelling of the extremities or lymph nodes.  The patient verbalized understanding of the proper use and possible adverse effects of ivermectin.  All of the patient's questions and concerns were addressed.
Metronidazole Pregnancy And Lactation Text: This medication is Pregnancy Category B and considered safe during pregnancy.  It is also excreted in breast milk.
Rituxan Counseling:  I discussed with the patient the risks of Rituxan infusions. Side effects can include infusion reactions, severe drug rashes including mucocutaneous reactions, reactivation of latent hepatitis and other infections and rarely progressive multifocal leukoencephalopathy.  All of the patient's questions and concerns were addressed.
Dupixent Pregnancy And Lactation Text: This medication likely crosses the placenta but the risk for the fetus is uncertain. This medication is excreted in breast milk.
Olanzapine Counseling- I discussed with the patient the common side effects of olanzapine including but are not limited to: lack of energy, dry mouth, increased appetite, sleepiness, tremor, constipation, dizziness, changes in behavior, or restlessness.  Explained that teenagers are more likely to experience headaches, abdominal pain, pain in the arms or legs, tiredness, and sleepiness.  Serious side effects include but are not limited: increased risk of death in elderly patients who are confused, have memory loss, or dementia-related psychosis; hyperglycemia; increased cholesterol and triglycerides; and weight gain.
Mirvaso Pregnancy And Lactation Text: This medication has not been assigned a Pregnancy Risk Category. It is unknown if the medication is excreted in breast milk.
Minocycline Counseling: Patient advised regarding possible photosensitivity and discoloration of the teeth, skin, lips, tongue and gums.  Patient instructed to avoid sunlight, if possible.  When exposed to sunlight, patients should wear protective clothing, sunglasses, and sunscreen.  The patient was instructed to call the office immediately if the following severe adverse effects occur:  hearing changes, easy bruising/bleeding, severe headache, or vision changes.  The patient verbalized understanding of the proper use and possible adverse effects of minocycline.  All of the patient's questions and concerns were addressed.
Calcipotriene Counseling:  I discussed with the patient the risks of calcipotriene including but not limited to erythema, scaling, itching, and irritation.
Valtrex Counseling: I discussed with the patient the risks of valacyclovir including but not limited to kidney damage, nausea, vomiting and severe allergy.  The patient understands that if the infection seems to be worsening or is not improving, they are to call.
Taltz Counseling: I discussed with the patient the risks of ixekizumab including but not limited to immunosuppression, serious infections, worsening of inflammatory bowel disease and drug reactions.  The patient understands that monitoring is required including a PPD at baseline and must alert us or the primary physician if symptoms of infection or other concerning signs are noted.
Cyclophosphamide Counseling:  I discussed with the patient the risks of cyclophosphamide including but not limited to hair loss, hormonal abnormalities, decreased fertility, abdominal pain, diarrhea, nausea and vomiting, bone marrow suppression and infection. The patient understands that monitoring is required while taking this medication.
Fluconazole Pregnancy And Lactation Text: This medication is Pregnancy Category C and it isn't know if it is safe during pregnancy. It is also excreted in breast milk.
Finasteride Pregnancy And Lactation Text: This medication is absolutely contraindicated during pregnancy. It is unknown if it is excreted in breast milk.
Topical Ketoconazole Counseling: Patient counseled that this medication may cause skin irritation or allergic reactions.  In the event of skin irritation, the patient was advised to reduce the amount of the drug applied or use it less frequently.   The patient verbalized understanding of the proper use and possible adverse effects of ketoconazole.  All of the patient's questions and concerns were addressed.
Glycopyrrolate Pregnancy And Lactation Text: This medication is Pregnancy Category B and is considered safe during pregnancy. It is unknown if it is excreted breast milk.
Acitretin Counseling:  I discussed with the patient the risks of acitretin including but not limited to hair loss, dry lips/skin/eyes, liver damage, hyperlipidemia, depression/suicidal ideation, photosensitivity.  Serious rare side effects can include but are not limited to pancreatitis, pseudotumor cerebri, bony changes, clot formation/stroke/heart attack.  Patient understands that alcohol is contraindicated since it can result in liver toxicity and significantly prolong the elimination of the drug by many years.
Libtayo Pregnancy And Lactation Text: This medication is contraindicated in pregnancy and when breast feeding.
Detail Level: Simple
Cimetidine Counseling:  I discussed with the patient the risks of Cimetidine including but not limited to gynecomastia, headache, diarrhea, nausea, drowsiness, arrhythmias, pancreatitis, skin rashes, psychosis, bone marrow suppression and kidney toxicity.
Calcipotriene Pregnancy And Lactation Text: This medication has not been proven safe during pregnancy. It is unknown if this medication is excreted in breast milk.
Clofazimine Counseling:  I discussed with the patient the risks of clofazimine including but not limited to skin and eye pigmentation, liver damage, nausea/vomiting, gastrointestinal bleeding and allergy.
Valtrex Pregnancy And Lactation Text: this medication is Pregnancy Category B and is considered safe during pregnancy. This medication is not directly found in breast milk but it's metabolite acyclovir is present.
Rituxan Pregnancy And Lactation Text: This medication is Pregnancy Category C and it isn't know if it is safe during pregnancy. It is unknown if this medication is excreted in breast milk but similar antibodies are known to be excreted.
Eucrisa Counseling: Patient may experience a mild burning sensation during topical application. Eucrisa is not approved in children less than 2 years of age.
Zoryve Counseling:  I discussed with the patient that Zoryve is not for use in the eyes, mouth or vagina. The most commonly reported side effects include diarrhea, headache, insomnia, application site pain, upper respiratory tract infections, and urinary tract infections.  All of the patient's questions and concerns were addressed.
Olanzapine Pregnancy And Lactation Text: This medication is pregnancy category C.   There are no adequate and well controlled trials with olanzapine in pregnant females.  Olanzapine should be used during pregnancy only if the potential benefit justifies the potential risk to the fetus.   In a study in lactating healthy women, olanzapine was excreted in breast milk.  It is recommended that women taking olanzapine should not breast feed.
Enbrel Counseling:  I discussed with the patient the risks of etanercept including but not limited to myelosuppression, immunosuppression, autoimmune hepatitis, demyelinating diseases, lymphoma, and infections.  The patient understands that monitoring is required including a PPD at baseline and must alert us or the primary physician if symptoms of infection or other concerning signs are noted.
Cephalexin Pregnancy And Lactation Text: This medication is Pregnancy Category B and considered safe during pregnancy.  It is also excreted in breast milk but can be used safely for shorter doses.
Opzelura Counseling:  I discussed with the patient the risks of Opzelura including but not limited to nasopharngitis, bronchitis, ear infection, eosinophila, hives, diarrhea, folliculitis, tonsillitis, and rhinorrhea.  Taken orally, this medication has been linked to serious infections; higher rate of mortality; malignancy and lymphoproliferative disorders; major adverse cardiovascular events; thrombosis; thrombocytopenia, anemia, and neutropenia; and lipid elevations.
Cibinqo Pregnancy And Lactation Text: It is unknown if this medication will adversely affect pregnancy or breast feeding.  You should not take this medication if you are currently pregnant or planning a pregnancy or while breastfeeding.
Griseofulvin Counseling:  I discussed with the patient the risks of griseofulvin including but not limited to photosensitivity, cytopenia, liver damage, nausea/vomiting and severe allergy.  The patient understands that this medication is best absorbed when taken with a fatty meal (e.g., ice cream or french fries).
Birth Control Pills Counseling: Birth Control Pill Counseling: I discussed with the patient the potential side effects of OCPs including but not limited to increased risk of stroke, heart attack, thrombophlebitis, deep venous thrombosis, hepatic adenomas, breast changes, GI upset, headaches, and depression.  The patient verbalized understanding of the proper use and possible adverse effects of OCPs. All of the patient's questions and concerns were addressed.
Propranolol Counseling:  I discussed with the patient the risks of propranolol including but not limited to low heart rate, low blood pressure, low blood sugar, restlessness and increased cold sensitivity. They should call the office if they experience any of these side effects.
Aklief counseling:  Patient advised to apply a pea-sized amount only at bedtime and wait 30 minutes after washing their face before applying.  If too drying, patient may add a non-comedogenic moisturizer.  The most commonly reported side effects including irritation, redness, scaling, dryness, stinging, burning, itching, and increased risk of sunburn.  The patient verbalized understanding of the proper use and possible adverse effects of retinoids.  All of the patient's questions and concerns were addressed.
Hydroxychloroquine Counseling:  I discussed with the patient that a baseline ophthalmologic exam is needed at the start of therapy and every year thereafter while on therapy. A CBC may also be warranted for monitoring.  The side effects of this medication were discussed with the patient, including but not limited to agranulocytosis, aplastic anemia, seizures, rashes, retinopathy, and liver toxicity. Patient instructed to call the office should any adverse effect occur.  The patient verbalized understanding of the proper use and possible adverse effects of Plaquenil.  All the patient's questions and concerns were addressed.
Cyclophosphamide Pregnancy And Lactation Text: This medication is Pregnancy Category D and it isn't considered safe during pregnancy. This medication is excreted in breast milk.
Acitretin Pregnancy And Lactation Text: This medication is Pregnancy Category X and should not be given to women who are pregnant or may become pregnant in the future. This medication is excreted in breast milk.
Aklief Pregnancy And Lactation Text: It is unknown if this medication is safe to use during pregnancy.  It is unknown if this medication is excreted in breast milk.  Breastfeeding women should use the topical cream on the smallest area of the skin for the shortest time needed while breastfeeding.  Do not apply to nipple and areola.
Include Pregnancy/Lactation Warning?: No
Opzelura Pregnancy And Lactation Text: There is insufficient data to evaluate drug-associated risk for major birth defects, miscarriage, or other adverse maternal or fetal outcomes.  There is a pregnancy registry that monitors pregnancy outcomes in pregnant persons exposed to the medication during pregnancy.  It is unknown if this medication is excreted in breast milk.  Do not breastfeed during treatment and for about 4 weeks after the last dose.
Siliq Counseling:  I discussed with the patient the risks of Siliq including but not limited to new or worsening depression, suicidal thoughts and behavior, immunosuppression, malignancy, posterior leukoencephalopathy syndrome, and serious infections.  The patient understands that monitoring is required including a PPD at baseline and must alert us or the primary physician if symptoms of infection or other concerning signs are noted. There is also a special program designed to monitor depression which is required with Siliq.
Odomzo Counseling- I discussed with the patient the risks of Odomzo including but not limited to nausea, vomiting, diarrhea, constipation, weight loss, changes in the sense of taste, decreased appetite, muscle spasms, and hair loss.  The patient verbalized understanding of the proper use and possible adverse effects of Odomzo.  All of the patient's questions and concerns were addressed.
Solaraze Counseling:  I discussed with the patient the risks of Solaraze including but not limited to erythema, scaling, itching, weeping, crusting, and pain.
Olumiant Counseling: I discussed with the patient the risks of Olumiant therapy including but not limited to upper respiratory tract infections, shingles, cold sores, and nausea. Live vaccines should be avoided.  This medication has been linked to serious infections; higher rate of mortality; malignancy and lymphoproliferative disorders; major adverse cardiovascular events; thrombosis; gastrointestinal perforations; neutropenia; lymphopenia; anemia; liver enzyme elevations; and lipid elevations.
Topical Sulfur Applications Counseling: Topical Sulfur Counseling: Patient counseled that this medication may cause skin irritation or allergic reactions.  In the event of skin irritation, the patient was advised to reduce the amount of the drug applied or use it less frequently.   The patient verbalized understanding of the proper use and possible adverse effects of topical sulfur application.  All of the patient's questions and concerns were addressed.
Propranolol Pregnancy And Lactation Text: This medication is Pregnancy Category C and it isn't known if it is safe during pregnancy. It is excreted in breast milk.
Clindamycin Counseling: I counseled the patient regarding use of clindamycin as an antibiotic for prophylactic and/or therapeutic purposes. Clindamycin is active against numerous classes of bacteria, including skin bacteria. Side effects may include nausea, diarrhea, gastrointestinal upset, rash, hives, yeast infections, and in rare cases, colitis.
Oral Minoxidil Counseling- I discussed with the patient the risks of oral minoxidil including but not limited to shortness of breath, swelling of the feet or ankles, dizziness, lightheadedness, unwanted hair growth and allergic reaction.  The patient verbalized understanding of the proper use and possible adverse effects of oral minoxidil.  All of the patient's questions and concerns were addressed.
Adbry Counseling: I discussed with the patient the risks of tralokinumab including but not limited to eye infection and irritation, cold sores, injection site reactions, worsening of asthma, allergic reactions and increased risk of parasitic infection.  Live vaccines should be avoided while taking tralokinumab. The patient understands that monitoring is required and they must alert us or the primary physician if symptoms of infection or other concerning signs are noted.
Olumiant Pregnancy And Lactation Text: Based on animal studies, Olumiant may cause embryo-fetal harm when administered to pregnant women.  The medication should not be used in pregnancy.  Breastfeeding is not recommended during treatment.
Hydroxychloroquine Pregnancy And Lactation Text: This medication has been shown to cause fetal harm but it isn't assigned a Pregnancy Risk Category. There are small amounts excreted in breast milk.
Tremfya Counseling: I discussed with the patient the risks of guselkumab including but not limited to immunosuppression, serious infections, and drug reactions.  The patient understands that monitoring is required including a PPD at baseline and must alert us or the primary physician if symptoms of infection or other concerning signs are noted.
Birth Control Pills Pregnancy And Lactation Text: This medication should be avoided if pregnant and for the first 30 days post-partum.
Clindamycin Pregnancy And Lactation Text: This medication can be used in pregnancy if certain situations. Clindamycin is also present in breast milk.
Griseofulvin Pregnancy And Lactation Text: This medication is Pregnancy Category X and is known to cause serious birth defects. It is unknown if this medication is excreted in breast milk but breast feeding should be avoided.
Picato Counseling:  I discussed with the patient the risks of Picato including but not limited to erythema, scaling, itching, weeping, crusting, and pain.
Hydroquinone Counseling:  Patient advised that medication may result in skin irritation, lightening (hypopigmentation), dryness, and burning.  In the event of skin irritation, the patient was advised to reduce the amount of the drug applied or use it less frequently.  Rarely, spots that are treated with hydroquinone can become darker (pseudoochronosis).  Should this occur, patient instructed to stop medication and call the office. The patient verbalized understanding of the proper use and possible adverse effects of hydroquinone.  All of the patient's questions and concerns were addressed.
Solaraze Pregnancy And Lactation Text: This medication is Pregnancy Category B and is considered safe. There is some data to suggest avoiding during the third trimester. It is unknown if this medication is excreted in breast milk.
Quinolones Counseling:  I discussed with the patient the risks of fluoroquinolones including but not limited to GI upset, allergic reaction, drug rash, diarrhea, dizziness, photosensitivity, yeast infections, liver function test abnormalities, tendonitis/tendon rupture.
Colchicine Counseling:  Patient counseled regarding adverse effects including but not limited to stomach upset (nausea, vomiting, stomach pain, or diarrhea).  Patient instructed to limit alcohol consumption while taking this medication.  Colchicine may reduce blood counts especially with prolonged use.  The patient understands that monitoring of kidney function and blood counts may be required, especially at baseline. The patient verbalized understanding of the proper use and possible adverse effects of colchicine.  All of the patient's questions and concerns were addressed.
Cyclosporine Counseling:  I discussed with the patient the risks of cyclosporine including but not limited to hypertension, gingival hyperplasia,myelosuppression, immunosuppression, liver damage, kidney damage, neurotoxicity, lymphoma, and serious infections. The patient understands that monitoring is required including baseline blood pressure, CBC, CMP, lipid panel and uric acid, and then 1-2 times monthly CMP and blood pressure.
Doxepin Counseling:  Patient advised that the medication is sedating and not to drive a car after taking this medication. Patient informed of potential adverse effects including but not limited to dry mouth, urinary retention, and blurry vision.  The patient verbalized understanding of the proper use and possible adverse effects of doxepin.  All of the patient's questions and concerns were addressed.
Humira Counseling:  I discussed with the patient the risks of adalimumab including but not limited to myelosuppression, immunosuppression, autoimmune hepatitis, demyelinating diseases, lymphoma, and serious infections.  The patient understands that monitoring is required including a PPD at baseline and must alert us or the primary physician if symptoms of infection or other concerning signs are noted.
Azelaic Acid Counseling: Patient counseled that medicine may cause skin irritation and to avoid applying near the eyes.  In the event of skin irritation, the patient was advised to reduce the amount of the drug applied or use it less frequently.   The patient verbalized understanding of the proper use and possible adverse effects of azelaic acid.  All of the patient's questions and concerns were addressed.
Zyclara Counseling:  I discussed with the patient the risks of imiquimod including but not limited to erythema, scaling, itching, weeping, crusting, and pain.  Patient understands that the inflammatory response to imiquimod is variable from person to person and was educated regarded proper titration schedule.  If flu-like symptoms develop, patient knows to discontinue the medication and contact us.
Low Dose Naltrexone Counseling- I discussed with the patient the potential risks and side effects of low dose naltrexone including but not limited to: more vivid dreams, headaches, nausea, vomiting, abdominal pain, fatigue, dizziness, and anxiety.
Cantharidin Pregnancy And Lactation Text: The use of this medication during pregnancy or lactation is not recommended as there is insufficient data.
Adbry Pregnancy And Lactation Text: It is unknown if this medication will adversely affect pregnancy or breast feeding.
Bexarotene Counseling:  I discussed with the patient the risks of bexarotene including but not limited to hair loss, dry lips/skin/eyes, liver abnormalities, hyperlipidemia, pancreatitis, depression/suicidal ideation, photosensitivity, drug rash/allergic reactions, hypothyroidism, anemia, leukopenia, infection, cataracts, and teratogenicity.  Patient understands that they will need regular blood tests to check lipid profile, liver function tests, white blood cell count, thyroid function tests and pregnancy test if applicable.
SSKI Counseling:  I discussed with the patient the risks of SSKI including but not limited to thyroid abnormalities, metallic taste, GI upset, fever, headache, acne, arthralgias, paraesthesias, lymphadenopathy, easy bleeding, arrhythmias, and allergic reaction.
Spironolactone Counseling: Patient advised regarding risks of diarrhea, abdominal pain, hyperkalemia, birth defects (for female patients), liver toxicity and renal toxicity. The patient may need blood work to monitor liver and kidney function and potassium levels while on therapy. The patient verbalized understanding of the proper use and possible adverse effects of spironolactone.  All of the patient's questions and concerns were addressed.
Oral Minoxidil Pregnancy And Lactation Text: This medication should only be used when clearly needed if you are pregnant, attempting to become pregnant or breast feeding.
Topical Sulfur Applications Pregnancy And Lactation Text: This medication is Pregnancy Category C and has an unknown safety profile during pregnancy. It is unknown if this topical medication is excreted in breast milk.
Doxepin Pregnancy And Lactation Text: This medication is Pregnancy Category C and it isn't known if it is safe during pregnancy. It is also excreted in breast milk and breast feeding isn't recommended.
5-Fu Counseling: 5-Fluorouracil Counseling:  I discussed with the patient the risks of 5-fluorouracil including but not limited to erythema, scaling, itching, weeping, crusting, and pain.
Doxycycline Counseling:  Patient counseled regarding possible photosensitivity and increased risk for sunburn.  Patient instructed to avoid sunlight, if possible.  When exposed to sunlight, patients should wear protective clothing, sunglasses, and sunscreen.  The patient was instructed to call the office immediately if the following severe adverse effects occur:  hearing changes, easy bruising/bleeding, severe headache, or vision changes.  The patient verbalized understanding of the proper use and possible adverse effects of doxycycline.  All of the patient's questions and concerns were addressed.
Topical Retinoid counseling:  Patient advised to apply a pea-sized amount only at bedtime and wait 30 minutes after washing their face before applying.  If too drying, patient may add a non-comedogenic moisturizer. The patient verbalized understanding of the proper use and possible adverse effects of retinoids.  All of the patient's questions and concerns were addressed.
Itraconazole Counseling:  I discussed with the patient the risks of itraconazole including but not limited to liver damage, nausea/vomiting, neuropathy, and severe allergy.  The patient understands that this medication is best absorbed when taken with acidic beverages such as non-diet cola or ginger ale.  The patient understands that monitoring is required including baseline LFTs and repeat LFTs at intervals.  The patient understands that they are to contact us or the primary physician if concerning signs are noted.
Bexarotene Pregnancy And Lactation Text: This medication is Pregnancy Category X and should not be given to women who are pregnant or may become pregnant. This medication should not be used if you are breast feeding.
Rinvoq Counseling: I discussed with the patient the risks of Rinvoq therapy including but not limited to upper respiratory tract infections, shingles, cold sores, bronchitis, nausea, cough, fever, acne, and headache. Live vaccines should be avoided.  This medication has been linked to serious infections; higher rate of mortality; malignancy and lymphoproliferative disorders; major adverse cardiovascular events; thrombosis; thrombocytopenia, anemia, and neutropenia; lipid elevations; liver enzyme elevations; and gastrointestinal perforations.
Simponi Counseling:  I discussed with the patient the risks of golimumab including but not limited to myelosuppression, immunosuppression, autoimmune hepatitis, demyelinating diseases, lymphoma, and serious infections.  The patient understands that monitoring is required including a PPD at baseline and must alert us or the primary physician if symptoms of infection or other concerning signs are noted.
Sski Pregnancy And Lactation Text: This medication is Pregnancy Category D and isn't considered safe during pregnancy. It is excreted in breast milk.
Spironolactone Pregnancy And Lactation Text: This medication can cause feminization of the male fetus and should be avoided during pregnancy. The active metabolite is also found in breast milk.
Otezla Counseling: The side effects of Otezla were discussed with the patient, including but not limited to worsening or new depression, weight loss, diarrhea, nausea, upper respiratory tract infection, and headache. Patient instructed to call the office should any adverse effect occur.  The patient verbalized understanding of the proper use and possible adverse effects of Otezla.  All the patient's questions and concerns were addressed.
Dutasteride Male Counseling: Dustasteride Counseling:  I discussed with the patient the risks of use of dutasteride including but not limited to decreased libido, decreased ejaculate volume, and gynecomastia. Women who can become pregnant should not handle medication.  All of the patient's questions and concerns were addressed.
Methotrexate Counseling:  Patient counseled regarding adverse effects of methotrexate including but not limited to nausea, vomiting, abnormalities in liver function tests. Patients may develop mouth sores, rash, diarrhea, and abnormalities in blood counts. The patient understands that monitoring is required including LFT's and blood counts.  There is a rare possibility of scarring of the liver and lung problems that can occur when taking methotrexate. Persistent nausea, loss of appetite, pale stools, dark urine, cough, and shortness of breath should be reported immediately. Patient advised to discontinue methotrexate treatment at least three months before attempting to become pregnant.  I discussed the need for folate supplements while taking methotrexate.  These supplements can decrease side effects during methotrexate treatment. The patient verbalized understanding of the proper use and possible adverse effects of methotrexate.  All of the patient's questions and concerns were addressed.
Rifampin Counseling: I discussed with the patient the risks of rifampin including but not limited to liver damage, kidney damage, red-orange body fluids, nausea/vomiting and severe allergy.
Xolair Counseling:  Patient informed of potential adverse effects including but not limited to fever, muscle aches, rash and allergic reactions.  The patient verbalized understanding of the proper use and possible adverse effects of Xolair.  All of the patient's questions and concerns were addressed.
Hydroxyzine Counseling: Patient advised that the medication is sedating and not to drive a car after taking this medication.  Patient informed of potential adverse effects including but not limited to dry mouth, urinary retention, and blurry vision.  The patient verbalized understanding of the proper use and possible adverse effects of hydroxyzine.  All of the patient's questions and concerns were addressed.
Wartpeel Counseling:  I discussed with the patient the risks of Wartpeel including but not limited to erythema, scaling, itching, weeping, crusting, and pain.
Low Dose Naltrexone Pregnancy And Lactation Text: Naltrexone is pregnancy category C.  There have been no adequate and well-controlled studies in pregnant women.  It should be used in pregnancy only if the potential benefit justifies the potential risk to the fetus.   Limited data indicates that naltrexone is minimally excreted into breastmilk.
Cimzia Counseling:  I discussed with the patient the risks of Cimzia including but not limited to immunosuppression, allergic reactions and infections.  The patient understands that monitoring is required including a PPD at baseline and must alert us or the primary physician if symptoms of infection or other concerning signs are noted.
Isotretinoin Counseling: Patient should get monthly blood tests, not donate blood, not drive at night if vision affected, not share medication, and not undergo elective surgery for 6 months after tx completed. Side effects reviewed, pt to contact office should one occur.
Azithromycin Counseling:  I discussed with the patient the risks of azithromycin including but not limited to GI upset, allergic reaction, drug rash, diarrhea, and yeast infections.
Dapsone Counseling: I discussed with the patient the risks of dapsone including but not limited to hemolytic anemia, agranulocytosis, rashes, methemoglobinemia, kidney failure, peripheral neuropathy, headaches, GI upset, and liver toxicity.  Patients who start dapsone require monitoring including baseline LFTs and weekly CBCs for the first month, then every month thereafter.  The patient verbalized understanding of the proper use and possible adverse effects of dapsone.  All of the patient's questions and concerns were addressed.
Imiquimod Counseling:  I discussed with the patient the risks of imiquimod including but not limited to erythema, scaling, itching, weeping, crusting, and pain.  Patient understands that the inflammatory response to imiquimod is variable from person to person and was educated regarded proper titration schedule.  If flu-like symptoms develop, patient knows to discontinue the medication and contact us.
Ilumya Counseling: I discussed with the patient the risks of tildrakizumab including but not limited to immunosuppression, malignancy, posterior leukoencephalopathy syndrome, and serious infections.  The patient understands that monitoring is required including a PPD at baseline and must alert us or the primary physician if symptoms of infection or other concerning signs are noted.
Dutasteride Pregnancy And Lactation Text: This medication is absolutely contraindicated in women, especially during pregnancy and breast feeding. Feminization of male fetuses is possible if taking while pregnant.
Doxycycline Pregnancy And Lactation Text: This medication is Pregnancy Category D and not consider safe during pregnancy. It is also excreted in breast milk but is considered safe for shorter treatment courses.
Protopic Counseling: Patient may experience a mild burning sensation during topical application. Protopic is not approved in children less than 2 years of age. There have been case reports of hematologic and skin malignancies in patients using topical calcineurin inhibitors although causality is questionable.
Rinvoq Pregnancy And Lactation Text: Based on animal studies, Rinvoq may cause embryo-fetal harm when administered to pregnant women.  The medication should not be used in pregnancy.  Breastfeeding is not recommended during treatment and for 6 days after the last dose.